# Patient Record
Sex: MALE | Race: WHITE | Employment: FULL TIME | URBAN - METROPOLITAN AREA
[De-identification: names, ages, dates, MRNs, and addresses within clinical notes are randomized per-mention and may not be internally consistent; named-entity substitution may affect disease eponyms.]

---

## 2017-01-18 ENCOUNTER — TELEPHONE (OUTPATIENT)
Dept: FAMILY MEDICINE CLINIC | Age: 62
End: 2017-01-18

## 2017-01-18 DIAGNOSIS — E11.42 DIABETIC POLYNEUROPATHY ASSOCIATED WITH TYPE 2 DIABETES MELLITUS (HCC): ICD-10-CM

## 2017-01-19 RX ORDER — CLONAZEPAM 0.5 MG/1
0.5 TABLET ORAL 2 TIMES DAILY
Qty: 180 TAB | Refills: 1 | OUTPATIENT
Start: 2017-01-19 | End: 2017-07-13 | Stop reason: SDUPTHER

## 2017-01-19 NOTE — TELEPHONE ENCOUNTER
From: Tutu Johnson  To: Kristopher Delatorre MD  Sent: 1/18/2017 9:06 PM EST  Subject: Medication Renewal Request    Original authorizing provider: MD Tutu Medina would like a refill of the following medications:  clonazePAM (KLONOPIN) 0.5 mg tablet Kristopher Delatorre MD]    Preferred pharmacy: [id number]:

## 2017-02-06 ENCOUNTER — OFFICE VISIT (OUTPATIENT)
Dept: FAMILY MEDICINE CLINIC | Age: 62
End: 2017-02-06

## 2017-02-06 VITALS
DIASTOLIC BLOOD PRESSURE: 82 MMHG | TEMPERATURE: 99.3 F | BODY MASS INDEX: 26.31 KG/M2 | SYSTOLIC BLOOD PRESSURE: 138 MMHG | HEIGHT: 68 IN | OXYGEN SATURATION: 97 % | HEART RATE: 103 BPM | RESPIRATION RATE: 16 BRPM | WEIGHT: 173.6 LBS

## 2017-02-06 DIAGNOSIS — J22 LOWER RESP. TRACT INFECTION: Primary | ICD-10-CM

## 2017-02-06 RX ORDER — AZITHROMYCIN 250 MG/1
TABLET, FILM COATED ORAL
Qty: 6 TAB | Refills: 0 | Status: SHIPPED | OUTPATIENT
Start: 2017-02-06 | End: 2017-02-11

## 2017-02-06 RX ORDER — HYDROCODONE POLISTIREX AND CHLORPHENIRAMINE POLISTIREX 10; 8 MG/5ML; MG/5ML
5 SUSPENSION, EXTENDED RELEASE ORAL
Qty: 180 ML | Refills: 0 | Status: SHIPPED | OUTPATIENT
Start: 2017-02-06 | End: 2017-05-19 | Stop reason: ALTCHOICE

## 2017-02-06 NOTE — PROGRESS NOTES
HISTORY OF PRESENT ILLNESS  Janelle Ramos is a 63 y.o. male. Cold Symptoms   The history is provided by the patient and medical records. This is a new problem. Episode onset: 3-4 days ago. Associated symptoms include chills, sore throat (mild) and myalgias. 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) E10.49       Current Outpatient Prescriptions:     lisinopril (PRINIVIL, ZESTRIL) 20 mg tablet, TAKE 1 TABLET BY MOUTH TWICE DAILY, Disp: 180 Tab, Rfl: 2    clonazePAM (KLONOPIN) 0.5 mg tablet, Take 1 Tab by mouth two (2) times a day., Disp: 180 Tab, Rfl: 1    gabapentin (NEURONTIN) 300 mg capsule, TAKE 1 CAPSULE BY MOUTH qhs GENERIC FOR NEURONTIN., Disp: 90 Cap, Rfl: 2    atorvastatin (LIPITOR) 20 mg tablet, TAKE 1 TABLET BY MOUTH DAILY, Disp: 90 Tab, Rfl: 3    omeprazole (PRILOSEC) 40 mg capsule, TAKE ONE CAPSULE BY MOUTH DAILY, Disp: 90 Cap, Rfl: 3    glucagon (GLUCAGON EMERGENCY KIT, HUMAN,) 1 mg injection, 1 mg by IntraMUSCular route as needed (low blood sugar). , Disp: 3 Kit, Rfl: 1    clopidogrel (PLAVIX) 75 mg tablet, TAKE 1 TABLET BY MOUTH DAILY, Disp: 90 Tab, Rfl: 3    Alpha Lipoic Acid 200 mg tab, Take  by mouth daily. , Disp: , Rfl:     HUMALOG 100 unit/mL injection, USE AS DIRECTED WITH PUMP, Disp: 180 mL, Rfl: 12    ascorbic acid (VITAMIN C) 1,000 mg tablet, Take  by mouth daily. , Disp: , Rfl:     multivitamins-minerals-lutein (CENTRUM SILVER) Tab, Take  by mouth daily. , Disp: , Rfl:     vitamin e (E GEMS) 1,000 unit capsule, Take 1,000 Units by mouth daily. Patient is taking 400 iu, Disp: , Rfl:     Cholecalciferol, Vitamin D3, 5,000 unit Tab, Take  by mouth daily. Patient is currently taking 2000 iu, Disp: , Rfl:     omega-3 fatty acids-vitamin e (FISH OIL) 1,000 mg cap, Take 1 Cap by mouth daily. , Disp: , Rfl:     coenzyme q10 10 mg cap, Take  by mouth two (2) times a day., Disp: , Rfl:     Review of Systems   Constitutional: Positive for chills. Negative for fever. HENT: Positive for sore throat (mild). Respiratory: Positive for cough. Negative for sputum production. Burning with cough   Musculoskeletal: Positive for myalgias. Visit Vitals    /82 (BP 1 Location: Left arm, BP Patient Position: Sitting)    Pulse (!) 103    Temp 99.3 °F (37.4 °C) (Oral)    Resp 16    Ht 5' 8\" (1.727 m)    Wt 173 lb 9.6 oz (78.7 kg)    SpO2 97%    BMI 26.4 kg/m2     Physical Exam   Constitutional: He is oriented to person, place, and time. He appears well-developed and well-nourished. HENT:   Head: Normocephalic. Right Ear: Tympanic membrane and ear canal normal.   Left Ear: Tympanic membrane and ear canal normal.   Mouth/Throat: Oropharynx is clear and moist.   Eyes: Conjunctivae and EOM are normal.   Neck: Neck supple. Cardiovascular: Normal rate, regular rhythm and normal heart sounds. Pulmonary/Chest: Effort normal and breath sounds normal.   Lymphadenopathy:     He has no cervical adenopathy. Neurological: He is alert and oriented to person, place, and time. Skin: Skin is warm and dry. Psychiatric: He has a normal mood and affect. His behavior is normal.   Nursing note and vitals reviewed.     CXR - no infiltrarte seen  - radiology interpretation pending  ASSESSMENT and PLAN    ICD-10-CM ICD-9-CM 1. Lower resp. tract infection J22 519.8 XR CHEST PA LAT      azithromycin (ZITHROMAX) 250 mg tablet      chlorpheniramine-HYDROcodone (TUSSIONEX) 10-8 mg/5 mL suspension   Complete prescribed course of antibiotics. Follow up for new symptoms, worsening symptoms or failure to improve.

## 2017-02-06 NOTE — PATIENT INSTRUCTIONS
Complete prescribed course of antibiotics. Follow up for new symptoms, worsening symptoms or failure to improve.

## 2017-02-06 NOTE — MR AVS SNAPSHOT
Visit Information Date & Time Provider Department Dept. Phone Encounter #  
 2/6/2017 10:45 AM Ziyad Barron MD 3 [facility] [phone] [id number] Your Appointments 5/5/2017  9:00 AM  
Follow Up with Richa Abreu MD  
3 [facility]) Appt Note: 6 month fu  
 [address] [facility] [address] [facility] [address] [phone]  
  
   
 [address] Upcoming Health Maintenance Date Due Hepatitis C Screening 1955 MICROALBUMIN Q1 2/12/2017 HEMOGLOBIN A1C Q6M 5/4/2017 EYE EXAM RETINAL OR DILATED Q1 10/21/2017 FOOT EXAM Q1 11/4/2017 LIPID PANEL Q1 11/4/2017 COLONOSCOPY 12/31/2019 DTaP/Tdap/Td series (2 - Td) 8/28/2025 Allergies as of 2/6/2017  Review Complete On: 2/6/2017 By: Ziyad Barron MD  
 No Known Allergies Current Immunizations  Never Reviewed Name Date Influenza Vaccine (Quad) PF 8/26/2016  9:36 AM  
 Influenza Vaccine PF 11/8/2013 Pneumococcal Polysaccharide (PPSV-23) 11/4/2016  9:15 AM  
 Tdap 8/28/2015 Not reviewed this visit You Were Diagnosed With   
  
 Codes Comments Lower resp. tract infection    -  Primary ICD-10-CM: Pardeep Goldmann ICD-9-CM: 519.8 Vitals BP Pulse Temp Resp Height(growth percentile) Weight(growth percentile) 138/82 (BP 1 Location: Left arm, BP Patient Position: Sitting) (!) 103 99.3 °F (37.4 °C) (Oral) 16 5' 8\" (1.727 m) 173 lb 9.6 oz (78.7 kg) SpO2 BMI Smoking Status 97% 26.4 kg/m2 Never Smoker BMI and BSA Data Body Mass Index Body Surface Area  
 26.4 kg/m 2 1.94 m 2 Preferred Pharmacy Pharmacy Name Phone [place] [address], [address] [phone] Your Updated Medication List  
  
   
This list is accurate as of: 2/6/17 11:22 AM.  Always use your most recent med list.  
  
  
  
  
 Alpha Lipoic Acid 200 mg Tab Take  by mouth daily. atorvastatin 20 mg tablet Commonly known as:  LIPITOR  
TAKE 1 TABLET BY MOUTH DAILY  
  
 azithromycin 250 mg tablet Commonly known as:  Radha Pickup Take 2 tablets today, then take 1 tablet daily CENTRUM SILVER Tab tablet Generic drug:  multivitamins-minerals-lutein Take  by mouth daily. chlorpheniramine-HYDROcodone 10-8 mg/5 mL suspension Commonly known as:  Leva [place] Take 5 mL by mouth every twelve (12) hours as needed for Cough. Max Daily Amount: 10 mL. cholecalciferol (VITAMIN D3) 5,000 unit Tab tablet Commonly known as:  VITAMIN D3 Take  by mouth daily. Patient is currently taking 2000 iu  
  
 clonazePAM 0.5 mg tablet Commonly known as:  Willeen Narrow Take 1 Tab by mouth two (2) times a day. clopidogrel 75 mg Tab Commonly known as:  PLAVIX TAKE 1 TABLET BY MOUTH DAILY coenzyme q10 10 mg Cap Take  by mouth two (2) times a day. FISH OIL 1,000 mg Cap Generic drug:  omega-3 fatty acids-vitamin e Take 1 Cap by mouth daily. gabapentin 300 mg capsule Commonly known as:  NEURONTIN  
TAKE 1 CAPSULE BY MOUTH qhs GENERIC FOR NEURONTIN.  
  
 glucagon 1 mg injection Commonly known as:  GLUCAGON EMERGENCY KIT (HUMAN) 1 mg by IntraMUSCular route as needed (low blood sugar). HumaLOG 100 unit/mL injection Generic drug:  insulin lispro USE AS DIRECTED WITH PUMP  
  
 lisinopril 20 mg tablet Commonly known as:  PRINIVIL, ZESTRIL  
TAKE 1 TABLET BY MOUTH TWICE DAILY  
  
 omeprazole 40 mg capsule Commonly known as:  PRILOSEC  
TAKE ONE CAPSULE BY MOUTH DAILY  
  
 VITAMIN C 1,000 mg tablet Generic drug:  ascorbic acid (vitamin C) Take  by mouth daily. vitamin e 1,000 unit capsule Commonly known as:  E GEMS Take 1,000 Units by mouth daily. Patient is taking 400 iu  
  
  
  
  
Prescriptions Printed Refills chlorpheniramine-HYDROcodone (TUSSIONEX) 10-8 mg/5 mL suspension 0 Sig: Take 5 mL by mouth every twelve (12) hours as needed for Cough. Max Daily Amount: 10 mL. Class: Print Route: Oral  
  
Prescriptions Sent to Pharmacy Refills  
 azithromycin (ZITHROMAX) 250 mg tablet 0 Sig: Take 2 tablets today, then take 1 tablet daily Class: Normal  
 Pharmacy: [facility] [address] [place] [address] Rd, [address] #: [phone] To-Do List   
 02/06/2017 Imaging:  XR CHEST PA LAT Patient Instructions Complete prescribed course of antibiotics. Follow up for new symptoms, worsening symptoms or failure to improve. Introducing hospitals & HEALTH SERVICES! Dear Mark Campbell: Thank you for requesting a [employer] account. Our records indicate that you already have an active [employer] account. You can access your account anytime at [email or web address] Did you know that you can access your hospital and ER discharge instructions at any time in Skin Scan? You can also review all of your test results from your hospital stay or ER visit. Additional Information If you have questions, please visit the Frequently Asked Questions section of the [employer] Scan website at [email or web address] Remember, Skin Scan is NOT to be used for urgent needs. For medical emergencies, dial 911. Now available from your iPhone and Android! Please provide this summary of care documentation to your next provider. Your primary care clinician is listed as Brody Jennings. If you have any questions after today's visit, please call [phone].

## 2017-02-06 NOTE — PROGRESS NOTES
Brittany Sotelo is a 63 y.o. male here for cold symptoms      1. Have you been to the ER, urgent care clinic or hospitalized since your last visit? NO.     2. Have you seen or consulted any other health care providers outside of the [address] [place] [address] since your last visit (Include any pap smears or colon screening)? NO      Do you have an Advanced Directive? NO    Would you like information on Advanced Directives?  NO

## 2017-03-10 LAB
CREATININE, EXTERNAL: <15.4
MICROALBUMIN UR TEST STR-MCNC: <3 MG/DL

## 2017-04-21 RX ORDER — TRAZODONE HYDROCHLORIDE 50 MG/1
50 TABLET ORAL
Qty: 30 TAB | Refills: 1 | Status: SHIPPED | OUTPATIENT
Start: 2017-04-21 | End: 2017-11-03 | Stop reason: ALTCHOICE

## 2017-05-19 ENCOUNTER — OFFICE VISIT (OUTPATIENT)
Dept: FAMILY MEDICINE CLINIC | Age: 62
End: 2017-05-19

## 2017-05-19 VITALS
DIASTOLIC BLOOD PRESSURE: 62 MMHG | BODY MASS INDEX: 26.83 KG/M2 | SYSTOLIC BLOOD PRESSURE: 114 MMHG | RESPIRATION RATE: 18 BRPM | HEIGHT: 68 IN | TEMPERATURE: 98.3 F | HEART RATE: 100 BPM | OXYGEN SATURATION: 96 % | WEIGHT: 177 LBS

## 2017-05-19 DIAGNOSIS — E10.40 TYPE 1 DIABETES MELLITUS WITH DIABETIC NEUROPATHY (HCC): Primary | ICD-10-CM

## 2017-05-19 DIAGNOSIS — F51.04 PSYCHOPHYSIOLOGICAL INSOMNIA: ICD-10-CM

## 2017-05-19 LAB — HBA1C MFR BLD HPLC: 7.5 %

## 2017-05-19 NOTE — PROGRESS NOTES
Assessment/Plan:    1. Type 1 diabetes mellitus with diabetic neuropathy (HCC)  -A1c 7.5. Managed by Dr. Elvira Olguin. - AMB POC HEMOGLOBIN A1C    2. Psychophysiological insomnia  -change to tylenol if needed, instead of ibuprofen/aleve    The plan was discussed with the patient. The patient verbalized understanding and is in agreement with the plan. All medication potential side effects were discussed with the patient. Health Maintenance:   Health Maintenance   Topic Date Due    Hepatitis C Screening  1955    ZOSTER VACCINE AGE 60>  02/10/2017    HEMOGLOBIN A1C Q6M  05/04/2017    INFLUENZA AGE 9 TO ADULT  08/01/2017    EYE EXAM RETINAL OR DILATED Q1  10/21/2017    FOOT EXAM Q1  11/04/2017    LIPID PANEL Q1  11/04/2017    MICROALBUMIN Q1  03/11/2018    COLONOSCOPY  12/31/2019    DTaP/Tdap/Td series (2 - Td) 08/28/2025    Pneumococcal 19-64 Medium Risk  Completed     Breezy Layne is a 63 y.o. male and presents with Follow Up Chronic Condition     Subjective:  DM1 - A1c     He c/o not sleeping well still. He will awaken at 1am for \"no reason\". Denies stress/worries. Since 3/2017, he's been taking ibuprofen (not pm) or aleve w/ relief. Typical sleep aids have the opposite effect of make him awake (ie benadryl). Trazodone didn't work either. He denies pain or nocturia. He doesn't drink ETOH.    ROS:  Constitutional: No recent weight change. No weakness/fatigue. No f/c. Cardiovascular: No CP/palpitations. No WILLAMS/orthopnea/PND. Respiratory: No cough/sputum, dyspnea, wheezing. Gastointestinal: No dysphagia, reflux. No n/v. No constipation/diarrhea. No melena/rectal bleeding. Genitourinary: No dysuria, urinary hesitancy, nocturia, hematuria. No incontinence. Neurological: No seizures/numbness/weakness. No paresthesias. Psychiatric:  No depression, anxiety. The problem list was updated as a part of today's visit.   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 SH:  Social History   Substance Use Topics    Smoking status: Never Smoker    Smokeless tobacco: Never Used    Alcohol use 1.5 oz/week     3 Cans of beer per week       Medications/Allergies:  Current Outpatient Prescriptions on File Prior to Visit   Medication Sig Dispense Refill    clopidogrel (PLAVIX) 75 mg tab TAKE 1 TABLET BY MOUTH DAILY 90 Tab 3    traZODone (DESYREL) 50 mg tablet Take 1 Tab by mouth nightly. 30 Tab 1    chlorpheniramine-HYDROcodone (TUSSIONEX) 10-8 mg/5 mL suspension Take 5 mL by mouth every twelve (12) hours as needed for Cough. Max Daily Amount: 10 mL. 180 mL 0    lisinopril (PRINIVIL, ZESTRIL) 20 mg tablet TAKE 1 TABLET BY MOUTH TWICE DAILY 180 Tab 2    clonazePAM (KLONOPIN) 0.5 mg tablet Take 1 Tab by mouth two (2) times a day. 180 Tab 1    gabapentin (NEURONTIN) 300 mg capsule TAKE 1 CAPSULE BY MOUTH qhs GENERIC FOR NEURONTIN. 90 Cap 2    atorvastatin (LIPITOR) 20 mg tablet TAKE 1 TABLET BY MOUTH DAILY 90 Tab 3    omeprazole (PRILOSEC) 40 mg capsule TAKE ONE CAPSULE BY MOUTH DAILY 90 Cap 3    glucagon (GLUCAGON EMERGENCY KIT, HUMAN,) 1 mg injection 1 mg by IntraMUSCular route as needed (low blood sugar). 3 Kit 1    Alpha Lipoic Acid 200 mg tab Take  by mouth daily.       HUMALOG 100 unit/mL injection USE AS DIRECTED WITH PUMP 180 mL 12    ascorbic acid (VITAMIN C) 1,000 mg tablet Take  by mouth daily.  multivitamins-minerals-lutein (CENTRUM SILVER) Tab Take  by mouth daily.  vitamin e (E GEMS) 1,000 unit capsule Take 1,000 Units by mouth daily. Patient is taking 400 iu      Cholecalciferol, Vitamin D3, 5,000 unit Tab Take  by mouth daily. Patient is currently taking 2000 iu      omega-3 fatty acids-vitamin e (FISH OIL) 1,000 mg cap Take 1 Cap by mouth daily.  coenzyme q10 10 mg cap Take  by mouth two (2) times a day. No current facility-administered medications on file prior to visit. No Known Allergies    Objective:  Visit Vitals    /62 (BP 1 Location: Right arm, BP Patient Position: Sitting)    Pulse 100    Temp 98.3 °F (36.8 °C) (Oral)    Resp 18    Ht 5' 8\" (1.727 m)    Wt 177 lb (80.3 kg)    SpO2 96%    BMI 26.91 kg/m2      Constitutional: Well developed, nourished, no distress, alert   CV: S1, S2.  RRR. No murmurs/rubs. No thrills palpated. No carotid bruits. Intact distal pulses. No edema. Pulm: No abnormalities on inspection. Clear to auscultation bilaterally. No wheezing/rhonchi. Normal effort. GI: Soft, nontender, nondistended. Normal active bowel sounds. Neuro: A/O x 3. No focal motor or sensory deficits. Speech normal.   Skin: No lesions/rashes on inspection. Psych: Appropriate affect, judgement and insight. Short-term memory intact.

## 2017-05-19 NOTE — MR AVS SNAPSHOT
Visit Information Date & Time Provider Department Dept. Phone Encounter #  
 5/19/2017  1:00 PM Xuan Milan, 3 [facility] [phone] [id number] Your Appointments 5/19/2017  1:00 PM  
Follow Up with Xuan Milan MD  
3 [facility] [address] [facility]) Appt Note: 6 month fu; r/s, provider out of office [address] [facility] [address] [phone]  
  
   
 [address]  [address] [facility] Upcoming Health Maintenance Date Due Hepatitis C Screening 1955 ZOSTER VACCINE AGE 60> 2/10/2017 INFLUENZA AGE 9 TO ADULT 8/1/2017 EYE EXAM RETINAL OR DILATED Q1 10/21/2017 FOOT EXAM Q1 11/4/2017 LIPID PANEL Q1 11/4/2017 HEMOGLOBIN A1C Q6M 11/19/2017 MICROALBUMIN Q1 3/11/2018 COLONOSCOPY 12/31/2019 DTaP/Tdap/Td series (2 - Td) 8/28/2025 Allergies as of 5/19/2017  Review Complete On: 5/19/2017 By: Xuan Milan MD  
 No Known Allergies Current Immunizations  Never Reviewed Name Date Influenza Vaccine (Quad) PF 8/26/2016  9:36 AM  
 Influenza Vaccine PF 11/8/2013 Pneumococcal Polysaccharide (PPSV-23) 11/4/2016  9:15 AM  
 Tdap 8/28/2015 Not reviewed this visit You Were Diagnosed With   
  
 Codes Comments Type 1 diabetes mellitus with diabetic neuropathy (HCC)    -  Primary ICD-10-CM: E10.40 ICD-9-CM: 250.61, 357.2 Psychophysiological insomnia     ICD-10-CM: F51.04 
ICD-9-CM: 307.42 Vitals BP Pulse Temp Resp Height(growth percentile) Weight(growth percentile) 114/62 (BP 1 Location: Right arm, BP Patient Position: Sitting) 100 98.3 °F (36.8 °C) (Oral) 18 5' 8\" (1.727 m) 177 lb (80.3 kg) SpO2 BMI Smoking Status 96% 26.91 kg/m2 Never Smoker Vitals History BMI and BSA Data Body Mass Index Body Surface Area  
 26.91 kg/m 2 1.96 m 2 Preferred Pharmacy Pharmacy Name Phone [place] [address] [address], [address] [facility] [address] [facility] [phone] Your Updated Medication List  
  
   
This list is accurate as of: 5/19/17 11:38 AM.  Always use your most recent med list.  
  
  
  
  
 Alpha Lipoic Acid 200 mg Tab Take  by mouth daily. atorvastatin 20 mg tablet Commonly known as:  LIPITOR  
TAKE 1 TABLET BY MOUTH DAILY CENTRUM SILVER Tab tablet Generic drug:  multivitamins-minerals-lutein Take  by mouth daily. cholecalciferol (VITAMIN D3) 5,000 unit Tab tablet Commonly known as:  VITAMIN D3 Take  by mouth daily. Patient is currently taking 2000 iu  
  
 clonazePAM 0.5 mg tablet Commonly known as:  Wendelyn Mech Take 1 Tab by mouth two (2) times a day. clopidogrel 75 mg Tab Commonly known as:  PLAVIX TAKE 1 TABLET BY MOUTH DAILY coenzyme q10 10 mg Cap Take  by mouth two (2) times a day. FISH OIL 1,000 mg Cap Generic drug:  omega-3 fatty acids-vitamin e Take 1 Cap by mouth daily. gabapentin 300 mg capsule Commonly known as:  NEURONTIN  
TAKE 1 CAPSULE BY MOUTH qhs GENERIC FOR NEURONTIN.  
  
 glucagon 1 mg injection Commonly known as:  GLUCAGON EMERGENCY KIT (HUMAN) 1 mg by IntraMUSCular route as needed (low blood sugar). HumaLOG 100 unit/mL injection Generic drug:  insulin lispro USE AS DIRECTED WITH PUMP  
  
 lisinopril 20 mg tablet Commonly known as:  PRINIVIL, ZESTRIL  
TAKE 1 TABLET BY MOUTH TWICE DAILY  
  
 omeprazole 40 mg capsule Commonly known as:  PRILOSEC  
TAKE ONE CAPSULE BY MOUTH DAILY  
  
 traZODone 50 mg tablet Commonly known as:  Indonesian Never Take 1 Tab by mouth nightly. VITAMIN C 1,000 mg tablet Generic drug:  ascorbic acid (vitamin C) Take  by mouth daily. vitamin e 1,000 unit capsule Commonly known as:  E GEMS Take 1,000 Units by mouth daily. Patient is taking 400 iu We Performed the Following AMB POC HEMOGLOBIN A1C [33307 CPT(R)] Introducing [facility] [place] & HEALTH SERVICES! Dear Yordan De La O: Thank you for requesting a Diffon account. Our records indicate that you already have an active Diffon account. You can access your account anytime at [email or web address] Did you know that you can access your hospital and ER discharge instructions at any time in Diffon? You can also review all of your test results from your hospital stay or ER visit. Additional Information If you have questions, please visit the Frequently Asked Questions section of the Diffon website at [email or web address]/[email or web address]. Remember, Diffon is NOT to be used for urgent needs. For medical emergencies, dial 911. Now available from your iPhone and Android! Please provide this summary of care documentation to your next provider. Your primary care clinician is listed as Brody Jennings. If you have any questions after today's visit, please call [phone].

## 2017-05-19 NOTE — PROGRESS NOTES
1. Have you been to the ER, urgent care clinic since your last visit? Hospitalized since your last visit? No     2. Have you seen or consulted any other health care providers outside of the [address] [place] [address] since your last visit? Include any pap smears or colon screening.    Yes, Dr. Shavon Patton retina specialists (PDT) Nov 2016/Jan- May  2017 , Dr. Lise Peterson ophthalmology Nov 2016/Jan 2017 -May 2017 (YAG procedure) cataract surgeon, Dr. Benito [place] Dec 2016/March 2017, dentist Jan 2017

## 2017-07-11 ENCOUNTER — OFFICE VISIT (OUTPATIENT)
Dept: FAMILY MEDICINE CLINIC | Age: 62
End: 2017-07-11

## 2017-07-11 VITALS
HEART RATE: 90 BPM | HEIGHT: 68 IN | WEIGHT: 177 LBS | OXYGEN SATURATION: 98 % | BODY MASS INDEX: 26.83 KG/M2 | DIASTOLIC BLOOD PRESSURE: 96 MMHG | RESPIRATION RATE: 12 BRPM | TEMPERATURE: 98.1 F | SYSTOLIC BLOOD PRESSURE: 160 MMHG

## 2017-07-11 DIAGNOSIS — J40 BRONCHITIS: Primary | ICD-10-CM

## 2017-07-11 RX ORDER — AZITHROMYCIN 250 MG/1
TABLET, FILM COATED ORAL
Qty: 6 TAB | Refills: 0 | Status: SHIPPED | OUTPATIENT
Start: 2017-07-11 | End: 2017-07-16

## 2017-07-11 RX ORDER — BENZONATATE 200 MG/1
200 CAPSULE ORAL
Qty: 15 CAP | Refills: 0 | Status: SHIPPED | OUTPATIENT
Start: 2017-07-11 | End: 2017-07-18

## 2017-07-11 NOTE — MR AVS SNAPSHOT
Visit Information Date & Time Provider Department Dept. Phone Encounter #  
 7/11/2017  3:45 PM Beena Mcintosh NP [address] [phone] [id number] Your Appointments 11/3/2017  1:00 PM  
Follow Up with Bibi Jimenez MD  
[address] [place] [address]) Appt Note: 6 month f/u  
 [address] 2[address] [facility] [address] [phone]  
  
   
 [address] Upcoming Health Maintenance Date Due Hepatitis C Screening 1955 ZOSTER VACCINE AGE 60> 2/10/2017 INFLUENZA AGE 9 TO ADULT 8/1/2017 FOOT EXAM Q1 11/4/2017 LIPID PANEL Q1 11/4/2017 HEMOGLOBIN A1C Q6M 11/19/2017 MICROALBUMIN Q1 3/11/2018 EYE EXAM RETINAL OR DILATED Q1 5/4/2018 COLONOSCOPY 12/31/2019 DTaP/Tdap/Td series (2 - Td) 8/28/2025 Allergies as of 7/11/2017  Review Complete On: 7/11/2017 By: Beena Mcintosh NP No Known Allergies Current Immunizations  Never Reviewed Name Date Influenza Vaccine (Quad) PF 8/26/2016  9:36 AM  
 Influenza Vaccine PF 11/8/2013 Pneumococcal Polysaccharide (PPSV-23) 11/4/2016  9:15 AM  
 Tdap 8/28/2015 Not reviewed this visit You Were Diagnosed With   
  
 Codes Comments Bronchitis    -  Primary ICD-10-CM: C06 ICD-9-CM: 473 Vitals BP Pulse Temp Resp Height(growth percentile) Weight(growth percentile) (!) 160/96 90 98.1 °F (36.7 °C) (Oral) 12 5' 8\" (1.727 m) 177 lb (80.3 kg) SpO2 BMI Smoking Status 98% 26.91 kg/m2 Never Smoker Vitals History BMI and BSA Data Body Mass Index Body Surface Area  
 26.91 kg/m 2 1.96 m 2 Preferred Pharmacy Pharmacy Name Phone [place] [facility] [address], [address] [place] [address] 10 [facility] [phone] Your Updated Medication List  
  
   
This list is accurate as of: 7/11/17  4:05 PM.  Always use your most recent med list.  
  
  
  
  
 Alpha Lipoic Acid 200 mg Tab Take  by mouth daily. atorvastatin 20 mg tablet Commonly known as:  LIPITOR  
TAKE 1 TABLET BY MOUTH DAILY  
  
 azithromycin 250 mg tablet Commonly known as:  [place] Take 2 tablets today, then take 1 tablet daily  
  
 benzonatate 200 mg capsule Commonly known as:  TESSALON Take 1 Cap by mouth three (3) times daily as needed for Cough for up to 7 days. CENTRUM SILVER Tab tablet Generic drug:  multivitamins-minerals-lutein Take  by mouth daily. cholecalciferol (VITAMIN D3) 5,000 unit Tab tablet Commonly known as:  VITAMIN D3 Take  by mouth daily. Patient is currently taking 2000 iu  
  
 clonazePAM 0.5 mg tablet Commonly known as:  [place] Take 1 Tab by mouth two (2) times a day. clopidogrel 75 mg Tab Commonly known as:  PLAVIX TAKE 1 TABLET BY MOUTH DAILY coenzyme q10 10 mg Cap Take  by mouth two (2) times a day. FISH OIL 1,000 mg Cap Generic drug:  omega-3 fatty acids-vitamin e Take 1 Cap by mouth daily. gabapentin 300 mg capsule Commonly known as:  NEURONTIN  
TAKE 1 CAPSULE BY MOUTH qhs GENERIC FOR NEURONTIN.  
  
 glucagon 1 mg injection Commonly known as:  GLUCAGON EMERGENCY KIT (HUMAN) 1 mg by IntraMUSCular route as needed (low blood sugar). HumaLOG 100 unit/mL injection Generic drug:  insulin lispro USE AS DIRECTED WITH PUMP  
  
 lisinopril 20 mg tablet Commonly known as:  PRINIVIL, ZESTRIL  
TAKE 1 TABLET BY MOUTH TWICE DAILY  
  
 omeprazole 40 mg capsule Commonly known as:  PRILOSEC  
TAKE ONE CAPSULE BY MOUTH DAILY  
  
 traZODone 50 mg tablet Commonly known as:  [place] Take 1 Tab by mouth nightly. VITAMIN C 1,000 mg tablet Generic drug:  ascorbic acid (vitamin C) Take  by mouth daily. vitamin e 1,000 unit capsule Commonly known as:  E GEMS Take 1,000 Units by mouth daily. Patient is taking 400 iu Prescriptions Sent to Pharmacy Refills  
 benzonatate (TESSALON) 200 mg capsule 0 Sig: Take 1 Cap by mouth three (3) times daily as needed for Cough for up to 7 days. Class: Normal  
 Pharmacy: [facility] [address] #: [phone] Route: Oral  
 azithromycin (ZITHROMAX) 250 mg tablet 0 Sig: Take 2 tablets today, then take 1 tablet daily Class: Normal  
 Pharmacy: [facility] [address] #: [phone] Patient Instructions Bronchitis: Care Instructions Your Care Instructions Bronchitis is inflammation of the bronchial tubes, which carry air to the lungs. The tubes swell and produce mucus, or phlegm. The mucus and inflamed bronchial tubes make you cough. You may have trouble breathing. Most cases of bronchitis are caused by viruses like those that cause colds. Antibiotics usually do not help and they may be harmful. Bronchitis usually develops rapidly and lasts about 2 to 3 weeks in otherwise healthy people. Follow-up care is a key part of your treatment and safety. Be sure to make and go to all appointments, and call your doctor if you are having problems. It's also a good idea to know your test results and keep a list of the medicines you take. How can you care for yourself at home? · Take all medicines exactly as prescribed. Call your doctor if you think you are having a problem with your medicine. · Get some extra rest. 
· Take an over-the-counter pain medicine, such as acetaminophen (Tylenol), ibuprofen (Advil, Motrin), or naproxen (Aleve) to reduce fever and relieve body aches. Read and follow all instructions on the label. · Do not take two or more pain medicines at the same time unless the doctor told you to.  Many pain medicines have acetaminophen, which is Tylenol. Too much acetaminophen (Tylenol) can be harmful. · Take an over-the-counter cough medicine that contains dextromethorphan to help quiet a dry, hacking cough so that you can sleep. Avoid cough medicines that have more than one active ingredient. Read and follow all instructions on the label. · Breathe moist air from a humidifier, hot shower, or sink filled with hot water. The heat and moisture will thin mucus so you can cough it out. · Do not smoke. Smoking can make bronchitis worse. If you need help quitting, talk to your doctor about stop-smoking programs and medicines. These can increase your chances of quitting for good. When should you call for help? Call 911 anytime you think you may need emergency care. For example, call if: 
· You have severe trouble breathing. Call your doctor now or seek immediate medical care if: 
· You have new or worse trouble breathing. · You cough up dark brown or bloody mucus (sputum). · You have a new or higher fever. · You have a new rash. Watch closely for changes in your health, and be sure to contact your doctor if: 
· You cough more deeply or more often, especially if you notice more mucus or a change in the color of your mucus. · You are not getting better as expected. Where can you learn more? Go to http://iftikhar-lucia.info/. Enter H333 in the search box to learn more about \"Bronchitis: Care Instructions. \" Current as of: March 25, 2017 Content Version: 11.3 © 9604-5398 [employer]. Care instructions adapted under license by [email or web address] (which disclaims liability or warranty for this information). If you have questions about a medical condition or this instruction, always ask your healthcare professional. [place] [address] any warranty or liability for your use of this information. Introducing [facility] [place] [facility]! Dear Kalpana Edouard: Thank you for requesting a [employer] account. Our records indicate that you already have an active [employer] account. You can access your account anytime at [email or web address] Did you know that you can access your hospital and ER discharge instructions at any time in [employer]? You can also review all of your test results from your hospital stay or ER visit. Additional Information If you have questions, please visit the Frequently Asked Questions section of the Pure[employer] website at [email or web address] Remember, Pure[employer]Fi is NOT to be used for urgent needs. For medical emergencies, dial 911. Now available from your iPhone and Android! Please provide this summary of care documentation to your next provider. Your primary care clinician is listed as Brody Jennings. If you have any questions after today's visit, please call [phone].

## 2017-07-11 NOTE — PATIENT INSTRUCTIONS
Bronchitis: Care Instructions  Your Care Instructions    Bronchitis is inflammation of the bronchial tubes, which carry air to the lungs. The tubes swell and produce mucus, or phlegm. The mucus and inflamed bronchial tubes make you cough. You may have trouble breathing. Most cases of bronchitis are caused by viruses like those that cause colds. Antibiotics usually do not help and they may be harmful. Bronchitis usually develops rapidly and lasts about 2 to 3 weeks in otherwise healthy people. Follow-up care is a key part of your treatment and safety. Be sure to make and go to all appointments, and call your doctor if you are having problems. It's also a good idea to know your test results and keep a list of the medicines you take. How can you care for yourself at home? · Take all medicines exactly as prescribed. Call your doctor if you think you are having a problem with your medicine. · Get some extra rest.  · Take an over-the-counter pain medicine, such as acetaminophen (Tylenol), ibuprofen (Advil, Motrin), or naproxen (Aleve) to reduce fever and relieve body aches. Read and follow all instructions on the label. · Do not take two or more pain medicines at the same time unless the doctor told you to. Many pain medicines have acetaminophen, which is Tylenol. Too much acetaminophen (Tylenol) can be harmful. · Take an over-the-counter cough medicine that contains dextromethorphan to help quiet a dry, hacking cough so that you can sleep. Avoid cough medicines that have more than one active ingredient. Read and follow all instructions on the label. · Breathe moist air from a humidifier, hot shower, or sink filled with hot water. The heat and moisture will thin mucus so you can cough it out. · Do not smoke. Smoking can make bronchitis worse. If you need help quitting, talk to your doctor about stop-smoking programs and medicines. These can increase your chances of quitting for good.   When should you call for help? Call 911 anytime you think you may need emergency care. For example, call if:  · You have severe trouble breathing. Call your doctor now or seek immediate medical care if:  · You have new or worse trouble breathing. · You cough up dark brown or bloody mucus (sputum). · You have a new or higher fever. · You have a new rash. Watch closely for changes in your health, and be sure to contact your doctor if:  · You cough more deeply or more often, especially if you notice more mucus or a change in the color of your mucus. · You are not getting better as expected. Where can you learn more? Go to http://iftikhar-lucia.info/. Enter H333 in the search box to learn more about \"Bronchitis: Care Instructions. \"  Current as of: March 25, 2017  Content Version: 11.3  © 6651-0084 [employer]. Care instructions adapted under license by [employer] (which disclaims liability or warranty for this information). If you have questions about a medical condition or this instruction, always ask your healthcare professional. [address] any warranty or liability for your use of this information.

## 2017-07-12 NOTE — PROGRESS NOTES
Subjective:      Wally Ramirez is an 63 y.o. male here for evaluation of cough. The cough is non-productive, without wheezing, dyspnea or hemoptysis, harsh, worsening over time and is aggravated by nothing. Onset of symptoms was 7 days ago, gradually worsening since that time. Associated symptoms include heartburn. Patient does not have a history of asthma. Patient does not have a history of environmental allergens. Patient has not recent travel. Patient does not have a history of smoking. Patient  has not previous Chest X-ray. Patient has not had a PPD done. Past Medical History:   Diagnosis Date    Branch retinal vein occlusion 2/4/2015    Diabetes mellitus ([facility]     Elevated cholesterol     GERD (gastroesophageal reflux disease) 8/20/2013    GERD (gastroesophageal reflux disease) 8/20/2013    H/O echocardiogram     nml    High triglycerides     Hypertension     Kidney stone     Normal cardiac stress test 2006    Reactive arthritis of multiple sites ([facility] 8/20/2013    TIA (transient ischemic attack) 11/7/2014     Family History   Problem Relation Age of Onset    Cancer Father 79     prostate    Parkinsonism Father     Other Other      parkinson uncle    Cancer Maternal Grandfather      mesothelioma     Current Outpatient Prescriptions   Medication Sig Dispense Refill    benzonatate (TESSALON) 200 mg capsule Take 1 Cap by mouth three (3) times daily as needed for Cough for up to 7 days. 15 Cap 0    azithromycin (ZITHROMAX) 250 mg tablet Take 2 tablets today, then take 1 tablet daily 6 Tab 0    clopidogrel (PLAVIX) 75 mg tab TAKE 1 TABLET BY MOUTH DAILY 90 Tab 3    traZODone (DESYREL) 50 mg tablet Take 1 Tab by mouth nightly. 30 Tab 1    lisinopril (PRINIVIL, ZESTRIL) 20 mg tablet TAKE 1 TABLET BY MOUTH TWICE DAILY 180 Tab 2    clonazePAM (KLONOPIN) 0.5 mg tablet Take 1 Tab by mouth two (2) times a day.  180 Tab 1    gabapentin (NEURONTIN) 300 mg capsule TAKE 1 CAPSULE BY MOUTH qhs GENERIC FOR NEURONTIN. 90 Cap 2    atorvastatin (LIPITOR) 20 mg tablet TAKE 1 TABLET BY MOUTH DAILY 90 Tab 3    omeprazole (PRILOSEC) 40 mg capsule TAKE ONE CAPSULE BY MOUTH DAILY 90 Cap 3    glucagon (GLUCAGON EMERGENCY KIT, HUMAN,) 1 mg injection 1 mg by IntraMUSCular route as needed (low blood sugar). 3 Kit 1    Alpha Lipoic Acid 200 mg tab Take  by mouth daily.  HUMALOG 100 unit/mL injection USE AS DIRECTED WITH PUMP 180 mL 12    ascorbic acid (VITAMIN C) 1,000 mg tablet Take  by mouth daily.  multivitamins-minerals-lutein (CENTRUM SILVER) Tab Take  by mouth daily.  vitamin e (E GEMS) 1,000 unit capsule Take 1,000 Units by mouth daily. Patient is taking 400 iu      Cholecalciferol, Vitamin D3, 5,000 unit Tab Take  by mouth daily. Patient is currently taking 2000 iu      omega-3 fatty acids-vitamin e (FISH OIL) 1,000 mg cap Take 1 Cap by mouth daily.  coenzyme q10 10 mg cap Take  by mouth two (2) times a day. No Known Allergies  Social History     Social History    Marital status:      Spouse name: N/A    Number of children: N/A    Years of education: N/A     Occupational History    Not on file. Social History Main Topics    Smoking status: Never Smoker    Smokeless tobacco: Never Used    Alcohol use No    Drug use: No    Sexual activity: Not on file     Other Topics Concern    Not on file     Social History Narrative     Review of Systems  A comprehensive review of systems was negative except for that written in the HPI.     Objective:     Visit Vitals    BP (!) 160/96    Pulse 90    Temp 98.1 °F (36.7 °C) (Oral)    Resp 12    Ht 5' 8\" (1.727 m)    Wt 177 lb (80.3 kg)    SpO2 98%    BMI 26.91 kg/m2     Oxygens Saturation 98% on  room air  General:  alert, cooperative, no distress, appears stated age   HEENT:  ENT exam normal, no neck nodes or sinus tenderness   Lungs: clear to auscultation bilaterally   Heart:  regular rate and rhythm, S1, S2 normal, no murmur, click, rub or gallop   Abdomen: soft, non-tender. Bowel sounds normal. No masses,  no organomegaly   Extremities: extremities normal, atraumatic, no cyanosis or edema      Neurological: alert, oriented x 3, no defects noted in general exam.     Assessment:     1. Bronchitis    - benzonatate (TESSALON) 200 mg capsule; Take 1 Cap by mouth three (3) times daily as needed for Cough for up to 7 days. Dispense: 15 Cap; Refill: 0  - azithromycin (ZITHROMAX) 250 mg tablet; Take 2 tablets today, then take 1 tablet daily  Dispense: 6 Tab; Refill: 0    Plan:   Follow up with  Your primary care provider or urgent care  if symptoms worsens or fail to improve within the next three to four days.

## 2017-07-13 ENCOUNTER — DOCUMENTATION ONLY (OUTPATIENT)
Dept: FAMILY MEDICINE CLINIC | Age: 62
End: 2017-07-13

## 2017-07-13 DIAGNOSIS — E11.42 DIABETIC POLYNEUROPATHY ASSOCIATED WITH TYPE 2 DIABETES MELLITUS (HCC): ICD-10-CM

## 2017-07-13 RX ORDER — CODEINE PHOSPHATE AND GUAIFENESIN 10; 100 MG/5ML; MG/5ML
5 SOLUTION ORAL
Qty: 180 ML | Refills: 0 | Status: SHIPPED | OUTPATIENT
Start: 2017-07-13 | End: 2017-11-03 | Stop reason: ALTCHOICE

## 2017-07-14 RX ORDER — CLONAZEPAM 0.5 MG/1
0.5 TABLET ORAL 2 TIMES DAILY
Qty: 180 TAB | Refills: 1 | Status: SHIPPED | OUTPATIENT
Start: 2017-07-14 | End: 2017-11-03 | Stop reason: SDUPTHER

## 2017-07-14 NOTE — TELEPHONE ENCOUNTER
From: Madeleine Abbott  To: Edwina Abreu MD  Sent: 7/13/2017 11:16 PM EDT  Subject: Medication Renewal Request    Original authorizing provider: MD Madeleine Anna would like a refill of the following medications:  clonazePAM (KLONOPIN) 0.5 mg tablet Edwina Abreu MD]    Preferred pharmacy: [address]:

## 2017-10-23 RX ORDER — LISINOPRIL 20 MG/1
TABLET ORAL
Qty: 180 TAB | Refills: 0 | Status: SHIPPED | OUTPATIENT
Start: 2017-10-23 | End: 2017-12-22 | Stop reason: SDUPTHER

## 2017-11-03 ENCOUNTER — OFFICE VISIT (OUTPATIENT)
Dept: FAMILY MEDICINE CLINIC | Age: 62
End: 2017-11-03

## 2017-11-03 VITALS
BODY MASS INDEX: 25.4 KG/M2 | HEART RATE: 105 BPM | RESPIRATION RATE: 20 BRPM | WEIGHT: 167.6 LBS | DIASTOLIC BLOOD PRESSURE: 60 MMHG | OXYGEN SATURATION: 97 % | HEIGHT: 68 IN | SYSTOLIC BLOOD PRESSURE: 110 MMHG | TEMPERATURE: 98.4 F

## 2017-11-03 DIAGNOSIS — Z00.00 ROUTINE GENERAL MEDICAL EXAMINATION AT A HEALTH CARE FACILITY: ICD-10-CM

## 2017-11-03 DIAGNOSIS — E11.42 DIABETIC POLYNEUROPATHY ASSOCIATED WITH TYPE 2 DIABETES MELLITUS (HCC): ICD-10-CM

## 2017-11-03 DIAGNOSIS — Z11.59 SPECIAL SCREENING EXAMINATION FOR VIRAL DISEASE: ICD-10-CM

## 2017-11-03 DIAGNOSIS — E10.40 TYPE 1 DIABETES MELLITUS WITH DIABETIC NEUROPATHY (HCC): Primary | ICD-10-CM

## 2017-11-03 DIAGNOSIS — I10 BENIGN HYPERTENSION: ICD-10-CM

## 2017-11-03 LAB — HBA1C MFR BLD HPLC: 6.8 %

## 2017-11-03 RX ORDER — CLONAZEPAM 0.5 MG/1
0.5 TABLET ORAL DAILY
Qty: 90 TAB | Refills: 1 | Status: SHIPPED | OUTPATIENT
Start: 2017-11-03 | End: 2018-03-18 | Stop reason: SDUPTHER

## 2017-11-03 NOTE — PROGRESS NOTES
Assessment/Plan:    1. Type 1 diabetes mellitus with diabetic neuropathy (HCC)  -A1c is 6.8  - AMB POC HEMOGLOBIN A1C  - MICROALBUMIN, UR, RAND W/ MICROALBUMIN/CREA RATIO; Future  -  DIABETES FOOT EXAM    2. Benign hypertension  -cont current regimen  - METABOLIC PANEL, COMPREHENSIVE; Future  - LIPID PANEL; Future  - CBC W/O DIFF; Future    3. Routine general medical examination at a health care facility  - METABOLIC PANEL, COMPREHENSIVE; Future  - LIPID PANEL; Future  - CBC W/O DIFF; Future    4. Special screening examination for viral disease  - HCV AB W/RFLX TO MARY [facility]; Future    The plan was discussed with the patient. The patient verbalized understanding and is in agreement with the plan. All medication potential side effects were discussed with the patient. Health Maintenance:   Health Maintenance   Topic Date Due    Hepatitis C Screening  1955    LIPID PANEL Q1  11/04/2017    HEMOGLOBIN A1C Q6M  11/19/2017    MICROALBUMIN Q1  03/11/2018    EYE EXAM RETINAL OR DILATED Q1  08/10/2018    FOOT EXAM Q1  11/03/2018    COLONOSCOPY  12/31/2019    DTaP/Tdap/Td series (2 - Td) 08/28/2025    ZOSTER VACCINE AGE 60>  Completed    Pneumococcal 19-64 Medium Risk  Completed    INFLUENZA AGE 9 TO ADULT  Completed       Jennifer Myrick is a 63 y.o. male and presents with Hypertension; Cholesterol Problem; and Diabetes     Subjective:  DM1 - A1c . Managed by endo. HTN - bp controlled. Up to date on preventative health care. ROS:  Constitutional: No recent weight change. No weakness/fatigue. No f/c. Cardiovascular: No CP/palpitations. No WILLAMS/orthopnea/PND. Respiratory: No cough/sputum, dyspnea, wheezing. Gastointestinal: No dysphagia, reflux. No n/v. No constipation/diarrhea. No melena/rectal bleeding. Neurological: No seizures/numbness/weakness. No paresthesias. Psychiatric:  No depression, anxiety. The problem list was updated as a part of today's visit.   Patient Active Problem List   Diagnosis Code    Benign hypertension I10    [facility] M02.39    Diabetic neuropathy (HCC) E11.40    GERD (gastroesophageal reflux disease) K21.9    History of nephrolithiasis Z87.442    Insulin pump in place Z96.41    Diabetic retinopathy ([facility] E11.319    Diplopia H53.2    Cerebral microvascular disease I67.9    Diffuse cerebral atrophy G31.9    Encephalomalacia on imaging study G93.89    History of stroke Z86.73    Choroidal neovascularization H35.059    Branch retinal vein occlusion K73.6753     (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 SH:  Social History   Substance Use Topics    Smoking status: Never Smoker    Smokeless tobacco: Never Used    Alcohol use No       Medications/Allergies:  Current Outpatient Prescriptions on File Prior to Visit   Medication Sig Dispense Refill    lisinopril (PRINIVIL, ZESTRIL) 20 mg tablet TAKE 1 TABLET BY MOUTH TWICE DAILY 180 Tab 0    atorvastatin (LIPITOR) 20 mg tablet TAKE 1 TABLET BY MOUTH DAILY 90 Tab 3    omeprazole (PRILOSEC) 40 mg capsule TAKE 1 CAPSULE BY MOUTH DAILY 90 Cap 3    gabapentin (NEURONTIN) 300 mg capsule TAKE 1 CAPSULE BY MOUTH EVERY NIGHT AT BEDTIME 90 Cap 3    clonazePAM (KLONOPIN) 0.5 mg tablet Take 1 Tab by mouth two (2) times a day. 180 Tab 1    clopidogrel (PLAVIX) 75 mg tab TAKE 1 TABLET BY MOUTH DAILY 90 Tab 3    glucagon (GLUCAGON EMERGENCY KIT, HUMAN,) 1 mg injection 1 mg by IntraMUSCular route as needed (low blood sugar). 3 Kit 1    Alpha Lipoic Acid 200 mg tab Take  by mouth daily.  HUMALOG 100 unit/mL injection USE AS DIRECTED WITH PUMP 180 mL 12    ascorbic acid (VITAMIN C) 1,000 mg tablet Take  by mouth daily.  multivitamins-minerals-lutein (CENTRUM SILVER) Tab Take  by mouth daily.       vitamin e (E GEMS) 1,000 unit capsule Take 1,000 Units by mouth daily. Patient is taking 400 iu      Cholecalciferol, Vitamin D3, 5,000 unit Tab Take  by mouth daily. Patient is currently taking 2000 iu      omega-3 fatty acids-vitamin e (FISH OIL) 1,000 mg cap Take 1 Cap by mouth daily.  coenzyme q10 10 mg cap Take  by mouth two (2) times a day.  guaiFENesin-codeine (ROBITUSSIN AC) 100-10 mg/5 mL solution Take 5 mL by mouth nightly as needed for Cough. Max Daily Amount: 5 mL. 180 mL 0    traZODone (DESYREL) 50 mg tablet Take 1 Tab by mouth nightly. 30 Tab 1     No current facility-administered medications on file prior to visit. No Known Allergies    Objective:  Visit Vitals    /60 (BP 1 Location: Right arm, BP Patient Position: Sitting)    Pulse (!) 105    Temp 98.4 °F (36.9 °C) (Oral)    Resp 20    Ht 5' 8\" (1.727 m)    Wt 167 lb 9.6 oz (76 kg)    SpO2 97%    BMI 25.48 kg/m2      Constitutional: Well developed, nourished, no distress, alert   CV: S1, S2.  RRR. No murmurs/rubs. No thrills palpated. No carotid bruits. Intact distal pulses. No edema. Pulm: No abnormalities on inspection. Clear to auscultation bilaterally. No wheezing/rhonchi. Normal effort. Neuro: A/O x 3. No focal motor or sensory deficits. Speech normal.   Psych: Appropriate affect, judgement and insight. Short-term memory intact.      Monofilament nml

## 2017-11-03 NOTE — MR AVS SNAPSHOT
Visit Information Date & Time Provider Department Dept. Phone Encounter #  
 11/3/2017  1:00 PM Nalini Espinosa, 3 [facility] [phone] [id number] Upcoming Health Maintenance Date Due Hepatitis C Screening 1955 LIPID PANEL Q1 11/4/2017 HEMOGLOBIN A1C Q6M 11/19/2017 MICROALBUMIN Q1 3/11/2018 EYE EXAM RETINAL OR DILATED Q1 8/10/2018 FOOT EXAM Q1 11/3/2018 COLONOSCOPY 12/31/2019 DTaP/Tdap/Td series (2 - Td) 8/28/2025 Allergies as of 11/3/2017  Review Complete On: 11/3/2017 By: Nalini Espinosa MD  
 No Known Allergies Current Immunizations  Never Reviewed Name Date Influenza Vaccine 9/29/2017 Influenza Vaccine (Quad) PF 8/26/2016  9:36 AM  
 Influenza Vaccine PF 11/8/2013 Pneumococcal Polysaccharide (PPSV-23) 11/4/2016  9:15 AM  
 Tdap 8/28/2015 Not reviewed this visit You Were Diagnosed With   
  
 Codes Comments Type 1 diabetes mellitus with diabetic neuropathy (HCC)    -  Primary ICD-10-CM: E10.40 ICD-9-CM: 250.61, 357.2 Benign hypertension     ICD-10-CM: I10 
ICD-9-CM: 401.1 Routine general medical examination at a health care facility     ICD-10-CM: Z00.00 ICD-9-CM: V70.0 Special screening examination for viral disease     ICD-10-CM: Z11.59 
ICD-9-CM: V73.99 Diabetic polyneuropathy associated with type 2 diabetes mellitus ([facility]     ICD-10-CM: E11.42 
ICD-9-CM: 250.60, 357.2 Vitals BP Pulse Temp Resp Height(growth percentile) Weight(growth percentile) 110/60 (BP 1 Location: Right arm, BP Patient Position: Sitting) (!) 105 98.4 °F (36.9 °C) (Oral) 20 5' 8\" (1.727 m) 167 lb 9.6 oz (76 kg) SpO2 BMI Smoking Status 97% 25.48 kg/m2 Never Smoker Vitals History BMI and BSA Data Body Mass Index Body Surface Area  
 25.48 kg/m 2 1.91 m 2 Preferred Pharmacy Pharmacy Name Phone  [address], [address] [facility] [address] [facility] [phone] Your Updated Medication List  
  
   
This list is accurate as of: 11/3/17  1:22 PM.  Always use your most recent med list.  
  
  
  
  
 Alpha Lipoic Acid 200 mg Tab Take  by mouth daily. atorvastatin 20 mg tablet Commonly known as:  LIPITOR  
TAKE 1 TABLET BY MOUTH DAILY CENTRUM SILVER Tab tablet Generic drug:  multivitamins-minerals-lutein Take  by mouth daily. cholecalciferol (VITAMIN D3) 5,000 unit Tab tablet Commonly known as:  VITAMIN D3 Take  by mouth daily. Patient is currently taking 2000 iu  
  
 clonazePAM 0.5 mg tablet Commonly known as:  Rhetta [place] Take 1 Tab by mouth daily. Max Daily Amount: 0.5 mg.  
  
 clopidogrel 75 mg Tab Commonly known as:  PLAVIX TAKE 1 TABLET BY MOUTH DAILY coenzyme q10 10 mg Cap Take  by mouth two (2) times a day. FISH OIL 1,000 mg Cap Generic drug:  omega-3 fatty acids-vitamin e Take 1 Cap by mouth daily. gabapentin 300 mg capsule Commonly known as:  NEURONTIN  
TAKE 1 CAPSULE BY MOUTH EVERY NIGHT AT BEDTIME  
  
 glucagon 1 mg injection Commonly known as:  GLUCAGON EMERGENCY KIT (HUMAN) 1 mg by IntraMUSCular route as needed (low blood sugar). HumaLOG 100 unit/mL injection Generic drug:  insulin lispro USE AS DIRECTED WITH PUMP  
  
 lisinopril 20 mg tablet Commonly known as:  PRINIVIL, ZESTRIL  
TAKE 1 TABLET BY MOUTH TWICE DAILY  
  
 omeprazole 40 mg capsule Commonly known as:  PRILOSEC  
TAKE 1 CAPSULE BY MOUTH DAILY  
  
 VITAMIN C 1,000 mg tablet Generic drug:  ascorbic acid (vitamin C) Take  by mouth daily. vitamin e 1,000 unit capsule Commonly known as:  E GEMS Take 1,000 Units by mouth daily. Patient is taking 400 iu  
  
  
  
  
Prescriptions Printed Refills  
 clonazePAM (KLONOPIN) 0.5 mg tablet 1 Sig: Take 1 Tab by mouth daily. Max Daily Amount: 0.5 mg.  
 Class: Print  Route: Oral  
  
 We Performed the Following AMB POC HEMOGLOBIN A1C [72074 CPT(R)]  DIABETES FOOT EXAM [HM7 Custom] To-Do List   
 11/03/2017 Lab:  CBC W/O DIFF   
  
 11/03/2017 Lab:  HCV AB W/RFLX TO MARY BETH   
  
 11/03/2017 Lab:  LIPID PANEL   
  
 11/03/2017 Lab:  METABOLIC PANEL, COMPREHENSIVE   
  
 11/03/2017 Lab:  MICROALBUMIN, UR, RAND W/ MICROALBUMIN/CREA RATIO Introducing [facility] & HEALTH SERVICES! Dear Marisela Rucker: Thank you for requesting a [employer] account. Our records indicate that you already have an active [employer] account. You can access your account anytime at [email or web address] Did you know that you can access your hospital and ER discharge instructions at any time in Kids Movie? You can also review all of your test results from your hospital stay or ER visit. Additional Information If you have questions, please visit the Frequently Asked Questions section of the [employer] website at [email or web address] Remember, [employer] is NOT to be used for urgent needs. For medical emergencies, dial 911. Now available from your iPhone and Android! Please provide this summary of care documentation to your next provider. Your primary care clinician is listed as Brody Jennings. If you have any questions after today's visit, please call [phone].

## 2017-11-03 NOTE — PROGRESS NOTES
Alicia Cochran is a 63 y.o. male (: 1955) presenting to address:    Chief Complaint   Patient presents with    Hypertension    Cholesterol Problem    Diabetes       Vitals:    17 1303   BP: 110/60   Pulse: (!) 105   Resp: 20   Temp: 98.4 °F (36.9 °C)   TempSrc: Oral   SpO2: 97%   Weight: 167 lb 9.6 oz (76 kg)   Height: 5' 8\" (1.727 m)   PainSc:   0 - No pain       Learning Assessment:     Learning Assessment 2015   PRIMARY LEARNER Patient   HIGHEST LEVEL OF EDUCATION - PRIMARY LEARNER  4 YEARS OF COLLEGE   BARRIERS PRIMARY LEARNER NONE   CO-LEARNER CAREGIVER No   PRIMARY LANGUAGE ENGLISH    NEED No   LEARNER PREFERENCE PRIMARY DEMONSTRATION     DEMONSTRATION   ANSWERED BY Patient   RELATIONSHIP SELF     Depression Screening:     PHQ over the last two weeks 2017   Little interest or pleasure in doing things Not at all   Feeling down, depressed or hopeless Not at all   Total Score PHQ 2 0     Abuse Screening:     Abuse Screening Questionnaire 11/3/2017   Do you ever feel afraid of your partner? N   Are you in a relationship with someone who physically or mentally threatens you? N   Is it safe for you to go home? Y     Coordination of Care Questionaire:   1. Have you been to the ER, urgent care clinic since your last visit? Hospitalized since your last visit? NO    2. Have you seen or consulted any other health care providers outside of the [address] [place] [address] since your last visit? Include any pap smears or colon screening. YES. Endo 10/20/17, retina specialist 17, dentist 8/11/27 8/10/17 ophthalmology  8/10/17    Advanced Directive:   1. Do you have an Advanced Directive? NO    2. Would you like information on Advanced Directives?  YES

## 2017-11-11 DIAGNOSIS — E11.42 TYPE 2 DIABETES MELLITUS WITH DIABETIC POLYNEUROPATHY (HCC): ICD-10-CM

## 2017-11-15 RX ORDER — GLUCAGON 1 MG
VIAL (EA) INJECTION
Qty: 3 KIT | Refills: 0 | Status: SHIPPED | OUTPATIENT
Start: 2017-11-15 | End: 2018-06-19 | Stop reason: SDUPTHER

## 2017-12-04 LAB — HBA1C MFR BLD HPLC: 7.4 %

## 2017-12-07 PROBLEM — K22.10 EROSIVE ESOPHAGITIS: Status: ACTIVE | Noted: 2017-12-07

## 2017-12-12 ENCOUNTER — TELEPHONE (OUTPATIENT)
Dept: FAMILY MEDICINE CLINIC | Age: 62
End: 2017-12-12

## 2017-12-12 NOTE — TELEPHONE ENCOUNTER
Patient is going to be seen by PT for 2 week 3 times a week. Patient is going to be seen for balance and endurance training. Physical Therapist wanted to make the provider aware that the patient has drug interaction between the omeprazole and the Clopidogrel. Physical therapy wanted to make sure that the provider will be signing off on the Physical therapy orders.      PLease advise

## 2017-12-22 ENCOUNTER — OFFICE VISIT (OUTPATIENT)
Dept: FAMILY MEDICINE CLINIC | Age: 62
End: 2017-12-22

## 2017-12-22 VITALS
RESPIRATION RATE: 20 BRPM | SYSTOLIC BLOOD PRESSURE: 98 MMHG | HEIGHT: 68 IN | BODY MASS INDEX: 23.95 KG/M2 | DIASTOLIC BLOOD PRESSURE: 57 MMHG | TEMPERATURE: 98.3 F | WEIGHT: 158 LBS | OXYGEN SATURATION: 96 % | HEART RATE: 93 BPM

## 2017-12-22 DIAGNOSIS — Z09 HOSPITAL DISCHARGE FOLLOW-UP: ICD-10-CM

## 2017-12-22 DIAGNOSIS — K22.10 EROSIVE ESOPHAGITIS: ICD-10-CM

## 2017-12-22 DIAGNOSIS — I10 BENIGN HYPERTENSION: Primary | ICD-10-CM

## 2017-12-22 RX ORDER — LISINOPRIL 10 MG/1
TABLET ORAL
Qty: 90 TAB | Refills: 0
Start: 2017-12-22 | End: 2018-03-18 | Stop reason: SDUPTHER

## 2017-12-22 NOTE — PROGRESS NOTES
Assessment/Plan:    1. Benign hypertension  -decrease dose to 10 mg.  F/u in 3 mos  - lisinopril (PRINIVIL, ZESTRIL) 10 mg tablet; TAKE 1 TABLET BY MOUTH DAILY  Dispense: 90 Tab; Refill: 0    2. Erosive esophagitis  -has f/u with GI scheduled for repeat endo in 2mos    3. Hospital discharge follow-up      The plan was discussed with the patient. The patient verbalized understanding and is in agreement with the plan. All medication potential side effects were discussed with the patient. Health Maintenance:   Health Maintenance   Topic Date Due    Hepatitis C Screening  1955    LIPID PANEL Q1  11/04/2017    MICROALBUMIN Q1  03/11/2018    HEMOGLOBIN A1C Q6M  05/03/2018    EYE EXAM RETINAL OR DILATED Q1  08/10/2018    FOOT EXAM Q1  11/03/2018    COLONOSCOPY  12/31/2019    DTaP/Tdap/Td series (2 - Td) 08/28/2025    ZOSTER VACCINE AGE 60>  Completed    Pneumococcal 19-64 Medium Risk  Completed    Influenza Age 5 to Adult  Completed       Rosio Angelo is a 63 y.o. male and presents with Hospital Follow Up     Subjective:  Pt recently hospitalized for esophagitis and UGI bleed. He was started on sucralfate, PPI. Pt has lost close to 10lb in past month. Plans to redo endoscopy in 2 mos. I have reviewed the labs and endoscopy report from the hospital.  Anemia was mild upon discharge. He has done home PT. He had a NST, which was negative. ROS:  Constitutional: No recent weight change. No weakness/fatigue. No f/c. Cardiovascular: No CP/palpitations. No WILLAMS/orthopnea/PND. Respiratory: No cough/sputum, dyspnea, wheezing. Gastointestinal: No dysphagia, reflux. No n/v. No constipation/diarrhea. No melena/rectal bleeding. The problem list was updated as a part of today's visit.   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 SH:  Social History   Substance Use Topics    Smoking status: Never Smoker    Smokeless tobacco: Never Used    Alcohol use No       Medications/Allergies:  Current Outpatient Prescriptions on File Prior to Visit   Medication Sig Dispense Refill    GLUCAGON EMERGENCY KIT, HUMAN, 1 mg injection INJECT 1MG IN THE MUSCLE AS NEEDED(LOW BLOOD SUGAR) 3 Kit 0    clonazePAM (KLONOPIN) 0.5 mg tablet Take 1 Tab by mouth daily. Max Daily Amount: 0.5 mg. 90 Tab 1    lisinopril (PRINIVIL, ZESTRIL) 20 mg tablet TAKE 1 TABLET BY MOUTH TWICE DAILY 180 Tab 0    atorvastatin (LIPITOR) 20 mg tablet TAKE 1 TABLET BY MOUTH DAILY 90 Tab 3    omeprazole (PRILOSEC) 40 mg capsule TAKE 1 CAPSULE BY MOUTH DAILY 90 Cap 3    gabapentin (NEURONTIN) 300 mg capsule TAKE 1 CAPSULE BY MOUTH EVERY NIGHT AT BEDTIME 90 Cap 3    clopidogrel (PLAVIX) 75 mg tab TAKE 1 TABLET BY MOUTH DAILY 90 Tab 3    Alpha Lipoic Acid 200 mg tab Take  by mouth daily.  HUMALOG 100 unit/mL injection USE AS DIRECTED WITH PUMP 180 mL 12    ascorbic acid (VITAMIN C) 1,000 mg tablet Take  by mouth daily.  multivitamins-minerals-lutein (CENTRUM SILVER) Tab Take  by mouth daily.  vitamin e (E GEMS) 1,000 unit capsule Take 1,000 Units by mouth daily. Patient is taking 400 iu      Cholecalciferol, Vitamin D3, 5,000 unit Tab Take  by mouth daily.  Patient is currently taking 2000 iu      omega-3 fatty acids-vitamin e (FISH OIL) 1,000 mg cap Take 1 Cap by mouth daily.  coenzyme q10 10 mg cap Take  by mouth two (2) times a day. No current facility-administered medications on file prior to visit. No Known Allergies    Objective:  Visit Vitals    BP 98/57 (BP 1 Location: Right arm, BP Patient Position: Sitting)    Pulse 93    Temp 98.3 °F (36.8 °C) (Oral)    Resp 20    Ht 5' 8\" (1.727 m)    Wt 158 lb (71.7 kg)    SpO2 96%    BMI 24.02 kg/m2      Constitutional: Well developed, nourished, no distress, alert   CV: S1, S2.  RRR. No murmurs/rubs. No thrills palpated. No carotid bruits. Intact distal pulses. No edema. Pulm: No abnormalities on inspection. Clear to auscultation bilaterally. No wheezing/rhonchi. Normal effort. GI: Soft, nontender, nondistended. Normal active bowel sounds.

## 2017-12-22 NOTE — PATIENT INSTRUCTIONS
Esophagitis: Care Instructions  Your Care Instructions    Esophagitis (say \"ih-sof-uh-JY-tus\") is irritation of the esophagus, the tube that carries food from your throat to your stomach. Acid reflux is the most common cause of this condition. When you have reflux, stomach acid and juices flow upward. This can cause pain or a burning feeling in your chest. You may have a sore throat. It may be hard to swallow. Other causes of this condition include some medicines and supplements. Allergies or an infection can also cause it. Your doctor will ask about your symptoms and past health. He or she might do tests to find the cause of your symptoms. Treatment depends on what is causing the problem. Treatment might include changing your diet or taking medicine to relieve your symptoms. It might also include changing a medicine that is causing your symptoms. If you have reflux, medicine that reduces the stomach acid helps your body heal. It might take 1 to 3 weeks to heal.  Follow-up care is a key part of your treatment and safety. Be sure to make and go to all appointments, and call your doctor if you are having problems. It's also a good idea to know your test results and keep a list of the medicines you take. How can you care for yourself at home? · If you have acid reflux, your doctor may recommend that you:  ¨ Eat several small meals instead of two or three large meals. After you eat, wait 2 to 3 hours before you lie down. ¨ Avoid chocolate, mint, alcohol, and spicy foods. ¨ Don't smoke or use smokeless tobacco. Smoking can make this condition worse. If you need help quitting, talk to your doctor about stop-smoking programs and medicines. These can increase your chances of quitting for good. ¨ Raise the head of your bed 6 to 8 inches if you have symptoms at night. ¨ Lose weight if you are overweight. ¨ Take an over-the-counter antacid, such as Maalox, Mylanta, or Tums.  Be careful when you take over-the-counter antacid medicines. Many of these medicines have aspirin in them. Read the label to make sure that you are not taking more than the recommended dose. Too much aspirin can be harmful. ¨ Take stronger acid reducers. Examples are famotidine (such as Pepcid), omeprazole (such as Prilosec), and ranitidine (such as Zantac). · If your condition is caused by infection, allergy, or other problems, use the medicine or treatments that your doctor recommends. · Be safe with medicines. Take your medicines exactly as prescribed. Call your doctor if you think you are having a problem with your medicine. When should you call for help? Call your doctor now or seek immediate medical care if:  ? · You have new or worse belly pain. ? · You are vomiting. ? Watch closely for changes in your health, and be sure to contact your doctor if:  ? · You have new or worse symptoms of reflux. ? · You have trouble or pain swallowing. ? · You are losing weight. ? · You do not get better as expected. Where can you learn more? Go to http://iftikhar-lucia.info/. Enter B645 in the search box to learn more about \"Esophagitis: Care Instructions. \"  Current as of: May 12, 2017  Content Version: 11.4  © 5828-4855 Healthwise, Incorporated. Care instructions adapted under license by [employer] (which disclaims liability or warranty for this information). If you have questions about a medical condition or this instruction, always ask your healthcare professional. [place] [address] any warranty or liability for your use of this information.

## 2017-12-22 NOTE — MR AVS SNAPSHOT
Visit Information Date & Time Provider Department Dept. Phone Encounter #  
 12/22/2017  2:15 PM Yanira Austin, 3 [facility] [phone] [id number] Follow-up Instructions Return in about 3 months (around 3/22/2018). Follow-up and Disposition History Your Appointments 5/4/2018  1:00 PM  
Office Visit with Yanira Austin MD  
3 [facility] Appt Note: 6 month f/u  
 [address] [facility] [address] [facility] [id number] [phone]  
  
   
 [address] [facility] [address] [facility] Upcoming Health Maintenance Date Due Hepatitis C Screening 1955 LIPID PANEL Q1 11/4/2017 MICROALBUMIN Q1 3/11/2018 HEMOGLOBIN A1C Q6M 5/3/2018 EYE EXAM RETINAL OR DILATED Q1 8/10/2018 FOOT EXAM Q1 11/3/2018 COLONOSCOPY 12/31/2019 DTaP/Tdap/Td series (2 - Td) 8/28/2025 Allergies as of 12/22/2017  Review Complete On: 12/22/2017 By: Yanira Austin MD  
 No Known Allergies Current Immunizations  Reviewed on 12/22/2017 Name Date Influenza Vaccine 12/4/2017 12:00 AM, 9/29/2017, 11/1/2013 12:00 AM  
 Influenza Vaccine (Quad) PF 8/26/2016  9:36 AM  
 Influenza Vaccine PF 11/8/2013 Pneumococcal Polysaccharide (PPSV-23) 11/4/2016  9:15 AM, 3/1/2011 12:00 AM  
 Tdap 8/28/2015 Reviewed by Emeli Cabrera LPN on 13/51/7363 at 10:19 AM  
You Were Diagnosed With   
  
 Codes Comments Benign hypertension    -  Primary ICD-10-CM: I10 
ICD-9-CM: 401.1 Erosive esophagitis     ICD-10-CM: K22.10 ICD-9-CM: 530.19 Hospital discharge follow-up     ICD-10-CM: [address] [facility] ICD-9-CM: V67.59 Vitals BP Pulse Temp Resp Height(growth percentile) Weight(growth percentile) 98/57 (BP 1 Location: Right arm, BP Patient Position: Sitting) 93 98.3 °F (36.8 °C) (Oral) 20 5' 8\" (1.727 m) 158 lb (71.7 kg) SpO2 BMI Smoking Status 96% 24.02 kg/m2 Never Smoker Vitals History BMI and BSA Data Body Mass Index Body Surface Area 24.02 kg/m 2 1.85 m 2 Preferred Pharmacy Pharmacy Name Phone [facility] [address] [facility], [address] [phone] Your Updated Medication List  
  
   
This list is accurate as of: 12/22/17  2:22 PM.  Always use your most recent med list.  
  
  
  
  
 Alpha Lipoic Acid 200 mg Tab Take  by mouth daily. atorvastatin 20 mg tablet Commonly known as:  LIPITOR  
TAKE 1 TABLET BY MOUTH DAILY CENTRUM SILVER Tab tablet Generic drug:  multivitamins-minerals-lutein Take  by mouth daily. cholecalciferol (VITAMIN D3) 5,000 unit Tab tablet Commonly known as:  VITAMIN D3 Take  by mouth daily. Patient is currently taking 2000 iu  
  
 clonazePAM 0.5 mg tablet Commonly known as:  [place] Take 1 Tab by mouth daily. Max Daily Amount: 0.5 mg.  
  
 clopidogrel 75 mg Tab Commonly known as:  PLAVIX TAKE 1 TABLET BY MOUTH DAILY coenzyme q10 10 mg Cap Take  by mouth two (2) times a day. FISH OIL 1,000 mg Cap Generic drug:  omega-3 fatty acids-vitamin e Take 1 Cap by mouth daily. gabapentin 300 mg capsule Commonly known as:  NEURONTIN  
TAKE 1 CAPSULE BY MOUTH EVERY NIGHT AT BEDTIME  
  
 GLUCAGON EMERGENCY KIT (HUMAN) 1 mg injection Generic drug:  glucagon INJECT 1MG IN THE MUSCLE AS NEEDED(LOW BLOOD SUGAR) HumaLOG 100 unit/mL injection Generic drug:  insulin lispro USE AS DIRECTED WITH PUMP  
  
 lisinopril 10 mg tablet Commonly known as:  PRINIVIL, ZESTRIL  
TAKE 1 TABLET BY MOUTH DAILY  
  
 omeprazole 40 mg capsule Commonly known as:  PRILOSEC  
TAKE 1 CAPSULE BY MOUTH DAILY  
  
 VITAMIN C 1,000 mg tablet Generic drug:  ascorbic acid (vitamin C) Take  by mouth daily. vitamin e 1,000 unit capsule Commonly known as:  E GEMS  
 Take 1,000 Units by mouth daily. Patient is taking 400 iu We Performed the Following AMB EXT HGBA1C [[id number] CPT(R)] Comments: This external order was created through the Results Console. Follow-up Instructions Return in about 3 months (around 3/22/2018). Patient Instructions Esophagitis: Care Instructions Your Care Instructions Esophagitis (say \"ih-sof-uh-JY-tus\") is irritation of the esophagus, the tube that carries food from your throat to your stomach. Acid reflux is the most common cause of this condition. When you have reflux, stomach acid and juices flow upward. This can cause pain or a burning feeling in your chest. You may have a sore throat. It may be hard to swallow. Other causes of this condition include some medicines and supplements. Allergies or an infection can also cause it. Your doctor will ask about your symptoms and past health. He or she might do tests to find the cause of your symptoms. Treatment depends on what is causing the problem. Treatment might include changing your diet or taking medicine to relieve your symptoms. It might also include changing a medicine that is causing your symptoms. If you have reflux, medicine that reduces the stomach acid helps your body heal. It might take 1 to 3 weeks to heal. 
Follow-up care is a key part of your treatment and safety. Be sure to make and go to all appointments, and call your doctor if you are having problems. It's also a good idea to know your test results and keep a list of the medicines you take. How can you care for yourself at home? · If you have acid reflux, your doctor may recommend that you: 
¨ Eat several small meals instead of two or three large meals. After you eat, wait 2 to 3 hours before you lie down. ¨ Avoid chocolate, mint, alcohol, and spicy foods.  
¨ Don't smoke or use smokeless tobacco. Smoking can make this condition worse. If you need help quitting, talk to your doctor about stop-smoking programs and medicines. These can increase your chances of quitting for good. ¨ Raise the head of your bed 6 to 8 inches if you have symptoms at night. ¨ Lose weight if you are overweight. ¨ Take an over-the-counter antacid, such as Maalox, Mylanta, or Tums. Be careful when you take over-the-counter antacid medicines. Many of these medicines have aspirin in them. Read the label to make sure that you are not taking more than the recommended dose. Too much aspirin can be harmful. ¨ Take stronger acid reducers. Examples are famotidine (such as Pepcid), omeprazole (such as Prilosec), and ranitidine (such as Zantac). · If your condition is caused by infection, allergy, or other problems, use the medicine or treatments that your doctor recommends. · Be safe with medicines. Take your medicines exactly as prescribed. Call your doctor if you think you are having a problem with your medicine. When should you call for help? Call your doctor now or seek immediate medical care if: 
? · You have new or worse belly pain. ? · You are vomiting. ? Watch closely for changes in your health, and be sure to contact your doctor if: 
? · You have new or worse symptoms of reflux. ? · You have trouble or pain swallowing. ? · You are losing weight. ? · You do not get better as expected. Where can you learn more? Go to http://iftikhar-lucia.info/. Enter E706 in the search box to learn more about \"Esophagitis: Care Instructions. \" Current as of: May 12, 2017 Content Version: 11.4 © 8642-2338 [employer]. Care instructions adapted under license by [employer] (which disclaims liability or warranty for this information).  If you have questions about a medical condition or this instruction, always ask your healthcare professional. [address] any warranty or liability for your use of this information. Introducing [facility] & HEALTH SERVICES! Dear Kenisha Bass: Thank you for requesting a [employer] account. Our records indicate that you already have an active [employer] account. You can access your account anytime at [email or web address] Did you know that you can access your hospital and ER discharge instructions at any time in [employer]? You can also review all of your test results from your hospital stay or ER visit. Additional Information If you have questions, please visit the Frequently Asked Questions section of the [employer] website at [email or web address] Remember, [employer] is NOT to be used for urgent needs. For medical emergencies, dial 911. Now available from your iPhone and Android! Please provide this summary of care documentation to your next provider. Your primary care clinician is listed as Brody Jennings. If you have any questions after today's visit, please call [phone].

## 2017-12-22 NOTE — PROGRESS NOTES
Rosa Gautam is a 63 y.o. male (: 1955) presenting to address:    Chief Complaint   Patient presents with   [facility] Follow Up       Vitals:    17 1403   BP: 98/57   Pulse: 93   Resp: 20   Temp: 98.3 °F (36.8 °C)   TempSrc: Oral   SpO2: 96%   Weight: 158 lb (71.7 kg)   Height: 5' 8\" (1.727 m)   PainSc:   0 - No pain       Learning Assessment:     Learning Assessment 2015   PRIMARY LEARNER Patient   HIGHEST LEVEL OF EDUCATION - PRIMARY LEARNER  4 YEARS OF COLLEGE   BARRIERS PRIMARY LEARNER NONE   CO-LEARNER CAREGIVER No   PRIMARY LANGUAGE ENGLISH    NEED No   LEARNER PREFERENCE PRIMARY DEMONSTRATION     DEMONSTRATION   ANSWERED BY Patient   RELATIONSHIP SELF     Depression Screening:     PHQ over the last two weeks 2017   Little interest or pleasure in doing things Not at all   Feeling down, depressed or hopeless Not at all   Total Score PHQ 2 0     Fall Risk Assessment:     Fall Risk Assessment, last 12 mths 2017   Able to walk? Yes   Fall in past 12 months? No     Abuse Screening:     Abuse Screening Questionnaire 11/3/2017   Do you ever feel afraid of your partner? N   Are you in a relationship with someone who physically or mentally threatens you? N   Is it safe for you to go home? Y     Coordination of Care Questionaire:   1. Have you been to the ER, urgent care clinic since your last visit? Hospitalized since your last visit? YES hospitals 17-     2. Have you seen or consulted any other health care providers outside of the [address] [place] [address] since your last visit? Include any pap smears or colon screening. NO    Advanced Directive:   1. Do you have an Advanced Directive? YES    2. Would you like information on Advanced Directives?  NO

## 2018-01-27 LAB
CREATININE, EXTERNAL: 0.73
HBA1C MFR BLD HPLC: 6.8 %
LDL-C, EXTERNAL: 59

## 2018-02-06 ENCOUNTER — TELEPHONE (OUTPATIENT)
Dept: FAMILY MEDICINE CLINIC | Age: 63
End: 2018-02-06

## 2018-02-07 ENCOUNTER — TELEPHONE (OUTPATIENT)
Dept: FAMILY MEDICINE CLINIC | Age: 63
End: 2018-02-07

## 2018-02-07 NOTE — TELEPHONE ENCOUNTER
Pt's therapy office , [facility] called asking for copies of recent records (release sent), faxed to [phone] for psychiatrist to review  They are also asking that we write a letter stating he is compliant with his medications and he is stable medically.

## 2018-02-15 ENCOUNTER — DOCUMENTATION ONLY (OUTPATIENT)
Dept: FAMILY MEDICINE CLINIC | Age: 63
End: 2018-02-15

## 2018-02-15 NOTE — PROGRESS NOTES
Medical records sent again to [facility] as needed. Alternate fax [phone]. Faxed to MARYA Bran. Called the office so they are aware to look for them.

## 2018-02-20 ENCOUNTER — DOCUMENTATION ONLY (OUTPATIENT)
Dept: FAMILY MEDICINE CLINIC | Age: 63
End: 2018-02-20

## 2018-02-20 NOTE — PROGRESS NOTES
Records release received from [address] [place] [address]. They are able to release his records to us. Sent to scanning.

## 2018-02-23 ENCOUNTER — TELEPHONE (OUTPATIENT)
Dept: FAMILY MEDICINE CLINIC | Age: 63
End: 2018-02-23

## 2018-02-23 PROBLEM — R44.0 AUDITORY HALLUCINATION: Status: ACTIVE | Noted: 2018-02-23

## 2018-02-23 NOTE — TELEPHONE ENCOUNTER
Dr Zay Watts, psychiatry called asking that pcp order brain imaging and neuropsych eval for late onset hallucinations. [phone].

## 2018-02-23 NOTE — TELEPHONE ENCOUNTER
I spoke with Dr. Kenyetta Simon who just requested MRI. I requested pt make appt to address issues. Dr. Kenyetta Simon said the patient (who was still in his office) would set up appt.

## 2018-03-01 ENCOUNTER — OFFICE VISIT (OUTPATIENT)
Dept: FAMILY MEDICINE CLINIC | Age: 63
End: 2018-03-01

## 2018-03-01 VITALS
SYSTOLIC BLOOD PRESSURE: 110 MMHG | TEMPERATURE: 98.4 F | OXYGEN SATURATION: 97 % | BODY MASS INDEX: 23.95 KG/M2 | WEIGHT: 158 LBS | HEART RATE: 99 BPM | HEIGHT: 68 IN | DIASTOLIC BLOOD PRESSURE: 68 MMHG | RESPIRATION RATE: 20 BRPM

## 2018-03-01 DIAGNOSIS — I67.89 CEREBRAL MICROVASCULAR DISEASE: ICD-10-CM

## 2018-03-01 DIAGNOSIS — R44.0 AUDITORY HALLUCINATION: Primary | ICD-10-CM
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
## 2018-03-01 NOTE — MR AVS SNAPSHOT
[facility] 
 
 
 [address] [facility] [id number] [phone] Patient: Horacio Willis MRN: [id number] EZA:[id number] Visit Information Date & Time Provider Department Dept. Phone Encounter #  
 3/1/2018  8:45 AM Sergey Myrick, [facility] [phone] [id number] Your Appointments 5/4/2018  1:00 PM  
Office Visit with Sergey Myrick MD  
3 [address]) Appt Note: 6 month f/u  
 [address] [facility] [address] [facility] [id number] [phone]  
  
   
 [address] Upcoming Health Maintenance Date Due Hepatitis C Screening 1955 LIPID PANEL Q1 11/4/2017 MICROALBUMIN Q1 3/11/2018 HEMOGLOBIN A1C Q6M 7/26/2018 EYE EXAM RETINAL OR DILATED Q1 8/10/2018 FOOT EXAM Q1 11/3/2018 COLONOSCOPY 12/31/2019 DTaP/Tdap/Td series (2 - Td) 8/28/2025 Allergies as of 3/1/2018  Review Complete On: 3/1/2018 By: Sergey Myrick MD  
 No Known Allergies Current Immunizations  Reviewed on 12/22/2017 Name Date Influenza Vaccine 12/4/2017 12:00 AM, 9/29/2017, 11/1/2013 12:00 AM  
 Influenza Vaccine (Quad) PF 8/26/2016  9:36 AM  
 Influenza Vaccine PF 11/8/2013 Pneumococcal Polysaccharide (PPSV-23) 11/4/2016  9:15 AM, 3/1/2011 12:00 AM  
 Tdap 8/28/2015 Not reviewed this visit You Were Diagnosed With   
  
 Codes Comments Auditory hallucination    -  Primary ICD-10-CM: R44.0 ICD-9-CM: 780.1 Cerebral microvascular disease     ICD-10-CM: I67.9 ICD-9-CM: 437.9 Vitals BP Pulse Temp Resp Height(growth percentile) Weight(growth percentile) 110/68 (BP 1 Location: Right arm, BP Patient Position: Sitting) 99 98.4 °F (36.9 °C) (Oral) 20 5' 8\" (1.727 m) 158 lb (71.7 kg) SpO2 BMI Smoking Status 97% 24.02 kg/m2 Never Smoker Vitals History BMI and BSA Data Body Mass Index Body Surface Area 24.02 kg/m 2 1.85 m 2 Preferred Pharmacy Pharmacy Name Phone [facility] [address], [address] [facility] [address] [facility] [phone] Your Updated Medication List  
  
   
This list is accurate as of 3/1/18  9:02 AM.  Always use your most recent med list.  
  
  
  
  
 Alpha Lipoic Acid 200 mg Tab Take  by mouth daily. atorvastatin 20 mg tablet Commonly known as:  LIPITOR  
TAKE 1 TABLET BY MOUTH DAILY CENTRUM SILVER Tab tablet Generic drug:  multivitamins-minerals-lutein Take  by mouth daily. cholecalciferol (VITAMIN D3) 5,000 unit Tab tablet Commonly known as:  VITAMIN D3 Take  by mouth daily. Patient is currently taking 2000 iu  
  
 clonazePAM 0.5 mg tablet Commonly known as:  Pamila Hippo Take 1 Tab by mouth daily. Max Daily Amount: 0.5 mg.  
  
 clopidogrel 75 mg Tab Commonly known as:  PLAVIX TAKE 1 TABLET BY MOUTH DAILY coenzyme q10 10 mg Cap Take  by mouth two (2) times a day. FISH OIL 1,000 mg Cap Generic drug:  omega-3 fatty acids-vitamin e Take 1 Cap by mouth daily. gabapentin 300 mg capsule Commonly known as:  NEURONTIN  
TAKE 1 CAPSULE BY MOUTH EVERY NIGHT AT BEDTIME  
  
 GLUCAGON EMERGENCY KIT (HUMAN) 1 mg injection Generic drug:  glucagon INJECT 1MG IN THE MUSCLE AS NEEDED(LOW BLOOD SUGAR) HumaLOG U-100 Insulin 100 unit/mL injection Generic drug:  insulin lispro USE AS DIRECTED WITH PUMP  
  
 LATUDA 20 mg Tab tablet Generic drug:  lurasidone Take 20 mg by mouth daily (with dinner). lisinopril 10 mg tablet Commonly known as:  PRINIVIL, ZESTRIL  
TAKE 1 TABLET BY MOUTH DAILY  
  
 omeprazole 40 mg capsule Commonly known as:  PRILOSEC  
TAKE 1 CAPSULE BY MOUTH DAILY  
  
 VITAMIN C 1,000 mg tablet Generic drug:  ascorbic acid (vitamin C) Take  by mouth daily. vitamin e 1,000 unit capsule Commonly known as:  E GEMS Take 1,000 Units by mouth daily. Patient is taking 400 iu To-Do List   
 03/01/2018 Imaging:  MRI BRAIN WO CONT Referral Information Referral ID Referred By Referred To  
  
 [id number] Lu Carver V Not Available Visits Status Start Date End Date 1 New Request 3/1/18 3/1/19 If your referral has a status of pending review or denied, additional information will be sent to support the outcome of this decision. Introducing hospitals & HEALTH SERVICES! Dear Cait Collier: Thank you for requesting a [employer] account. Our records indicate that you already have an active [employer] account. You can access your account anytime at [email or web address] Did you know that you can access your hospital and ER discharge instructions at any time in [employer]? You can also review all of your test results from your hospital stay or ER visit. Additional Information If you have questions, please visit the Frequently Asked Questions section of the [employer] website at [email or web address] Remember, [employer] is NOT to be used for urgent needs. For medical emergencies, dial 911. Now available from your iPhone and Android! Please provide this summary of care documentation to your next provider. Your primary care clinician is listed as Brody Jennings. If you have any questions after today's visit, please call [phone].

## 2018-03-01 NOTE — PROGRESS NOTES
Gin Hdz is a 66 y.o. male (: 1955) presenting to address:    Chief Complaint   Patient presents with    Referral Request    New Order     Scan       Vitals:    18 0834   BP: 110/68   Pulse: 99   Resp: 20   Temp: 98.4 °F (36.9 °C)   TempSrc: Oral   SpO2: 97%   Weight: 158 lb (71.7 kg)   Height: 5' 8\" (1.727 m)   PainSc:   0 - No pain       Learning Assessment:     Learning Assessment 2015   PRIMARY LEARNER Patient   HIGHEST LEVEL OF EDUCATION - PRIMARY LEARNER  4 YEARS OF COLLEGE   BARRIERS PRIMARY LEARNER NONE   CO-LEARNER CAREGIVER No   PRIMARY LANGUAGE ENGLISH    NEED No   LEARNER PREFERENCE PRIMARY DEMONSTRATION     DEMONSTRATION   ANSWERED BY Patient   RELATIONSHIP SELF     Depression Screening:     PHQ over the last two weeks 2017   Little interest or pleasure in doing things Not at all   Feeling down, depressed or hopeless Not at all   Total Score PHQ 2 0     Fall Risk Assessment:     Fall Risk Assessment, last 12 mths 2017   Able to walk? Yes   Fall in past 12 months? No     Abuse Screening:     Abuse Screening Questionnaire 11/3/2017   Do you ever feel afraid of your partner? N   Are you in a relationship with someone who physically or mentally threatens you? N   Is it safe for you to go home? Y     Coordination of Care Questionaire:   1. Have you been to the ER, urgent care clinic since your last visit? Hospitalized since your last visit? YES. 2/15/18 [facility]    2. Have you seen or consulted any other health care providers outside of the [address] [place] [address] since your last visit? Include any pap smears or colon screening. NO    Advanced Directive:   1. Do you have an Advanced Directive? YES    2. Would you like information on Advanced Directives?  NO

## 2018-03-18 DIAGNOSIS — I10 BENIGN HYPERTENSION: ICD-10-CM

## 2018-03-18 DIAGNOSIS — E11.42 DIABETIC POLYNEUROPATHY ASSOCIATED WITH TYPE 2 DIABETES MELLITUS (HCC): ICD-10-CM

## 2018-03-19 RX ORDER — LISINOPRIL 10 MG/1
TABLET ORAL
Qty: 90 TAB | Refills: 0 | Status: SHIPPED | OUTPATIENT
Start: 2018-03-19 | End: 2018-06-19 | Stop reason: SDUPTHER

## 2018-03-19 RX ORDER — CLONAZEPAM 0.5 MG/1
TABLET ORAL
Qty: 180 TAB | Refills: 0 | Status: SHIPPED | OUTPATIENT
Start: 2018-03-19 | End: 2018-06-17 | Stop reason: SDUPTHER

## 2018-03-19 NOTE — TELEPHONE ENCOUNTER
From: Carole Child  To: Aaliyah Barragan MD  Sent: 3/18/2018 6:32 PM EDT  Subject: Medication Renewal Request    Original authorizing provider: Aaliyah Barragan MD    Carole Sibley would like a refill of the following medications:  lisinopril (PRINIVIL, ZESTRIL) 10 mg tablet Aaliyah Barragan MD]    Preferred pharmacy: [facility]:

## 2018-06-17 DIAGNOSIS — E11.42 DIABETIC POLYNEUROPATHY ASSOCIATED WITH TYPE 2 DIABETES MELLITUS (HCC): ICD-10-CM

## 2018-06-18 RX ORDER — CLONAZEPAM 0.5 MG/1
TABLET ORAL
Qty: 180 TAB | Refills: 0 | Status: SHIPPED | OUTPATIENT
Start: 2018-06-18 | End: 2018-09-17 | Stop reason: SDUPTHER

## 2018-06-19 DIAGNOSIS — E11.42 TYPE 2 DIABETES MELLITUS WITH DIABETIC POLYNEUROPATHY (HCC): ICD-10-CM

## 2018-06-19 DIAGNOSIS — I10 BENIGN HYPERTENSION: ICD-10-CM

## 2018-06-19 RX ORDER — GLUCAGON 1 MG
VIAL (EA) INJECTION
Qty: 3 KIT | Refills: 0 | Status: SHIPPED | OUTPATIENT
Start: 2018-06-19 | End: 2018-07-31 | Stop reason: SDUPTHER

## 2018-06-19 RX ORDER — LISINOPRIL 10 MG/1
TABLET ORAL
Qty: 90 TAB | Refills: 0 | Status: SHIPPED | OUTPATIENT
Start: 2018-06-19 | End: 2018-09-07 | Stop reason: ALTCHOICE

## 2018-07-11 RX ORDER — GABAPENTIN 300 MG/1
CAPSULE ORAL
Qty: 90 CAP | Refills: 0 | Status: SHIPPED | OUTPATIENT
Start: 2018-07-11 | End: 2018-10-07 | Stop reason: SDUPTHER

## 2018-07-18 LAB
HBA1C MFR BLD HPLC: 6.9 %
LDL-C, EXTERNAL: 119

## 2018-07-31 DIAGNOSIS — E11.42 TYPE 2 DIABETES MELLITUS WITH DIABETIC POLYNEUROPATHY (HCC): ICD-10-CM

## 2018-07-31 RX ORDER — GLUCAGON 1 MG
VIAL (EA) INJECTION
Qty: 3 KIT | Refills: 0 | Status: SHIPPED | OUTPATIENT
Start: 2018-07-31 | End: 2018-08-01 | Stop reason: SDUPTHER

## 2018-08-01 DIAGNOSIS — E11.42 TYPE 2 DIABETES MELLITUS WITH DIABETIC POLYNEUROPATHY (HCC): ICD-10-CM

## 2018-08-01 RX ORDER — GLUCAGON 1 MG
VIAL (EA) INJECTION
Qty: 3 KIT | Refills: 0 | Status: SHIPPED | OUTPATIENT
Start: 2018-08-01 | End: 2018-12-01 | Stop reason: SDUPTHER

## 2018-09-07 ENCOUNTER — OFFICE VISIT (OUTPATIENT)
Dept: FAMILY MEDICINE CLINIC | Age: 63
End: 2018-09-07

## 2018-09-07 ENCOUNTER — HOSPITAL ENCOUNTER (OUTPATIENT)
Dept: LAB | Age: 63
Discharge: HOME | End: 2018-09-07
Payer: COMMERCIAL

## 2018-09-07 VITALS
HEIGHT: 68 IN | RESPIRATION RATE: 20 BRPM | DIASTOLIC BLOOD PRESSURE: 68 MMHG | TEMPERATURE: 98.6 F | WEIGHT: 161 LBS | HEART RATE: 95 BPM | OXYGEN SATURATION: 96 % | BODY MASS INDEX: 24.4 KG/M2 | SYSTOLIC BLOOD PRESSURE: 100 MMHG

## 2018-09-07 DIAGNOSIS — E78.00 PURE HYPERCHOLESTEROLEMIA: ICD-10-CM

## 2018-09-07 DIAGNOSIS — E10.40 TYPE 1 DIABETES MELLITUS WITH DIABETIC NEUROPATHY (HCC): Primary | ICD-10-CM

## 2018-09-07 DIAGNOSIS — R79.89 ELEVATED PROLACTIN LEVEL: ICD-10-CM

## 2018-09-07 DIAGNOSIS — E10.40 TYPE 1 DIABETES MELLITUS WITH DIABETIC NEUROPATHY (HCC): ICD-10-CM

## 2018-09-07 DIAGNOSIS — Z11.59 SPECIAL SCREENING EXAMINATION FOR VIRAL DISEASE: ICD-10-CM

## 2018-09-07 LAB
ALBUMIN SERPL-MCNC: 3.9 G/DL (ref 3.4–5)
ALBUMIN/GLOB SERPL: 1.3 {RATIO} (ref 0.8–1.7)
ALP SERPL-CCNC: 98 U/L (ref 45–117)
ALT SERPL-CCNC: 26 U/L (ref 16–61)
ANION GAP SERPL CALC-SCNC: 6 MMOL/L (ref 3–18)
AST SERPL-CCNC: 16 U/L (ref 15–37)
BILIRUB SERPL-MCNC: 0.4 MG/DL (ref 0.2–1)
BUN SERPL-MCNC: 21 MG/DL (ref 7–18)
BUN/CREAT SERPL: 23 (ref 12–20)
CALCIUM SERPL-MCNC: 9.1 MG/DL (ref 8.5–10.1)
CHLORIDE SERPL-SCNC: 106 MMOL/L (ref 100–108)
CHOLEST SERPL-MCNC: 186 MG/DL
CO2 SERPL-SCNC: 30 MMOL/L (ref 21–32)
CREAT SERPL-MCNC: 0.9 MG/DL (ref 0.6–1.3)
ERYTHROCYTE [DISTWIDTH] IN BLOOD BY AUTOMATED COUNT: 13.6 % (ref 11.6–14.5)
GLOBULIN SER CALC-MCNC: 3.1 G/DL (ref 2–4)
GLUCOSE SERPL-MCNC: 100 MG/DL (ref 74–99)
HBA1C MFR BLD HPLC: 6.7 %
HCT VFR BLD AUTO: 47.1 % (ref 36–48)
HDLC SERPL-MCNC: 52 MG/DL (ref 40–60)
HDLC SERPL: 3.6 {RATIO} (ref 0–5)
HGB BLD-MCNC: 15.4 G/DL (ref 13–16)
LDLC SERPL CALC-MCNC: 120.4 MG/DL (ref 0–100)
LIPID PROFILE,FLP: ABNORMAL
MCH RBC QN AUTO: 31.5 PG (ref 24–34)
MCHC RBC AUTO-ENTMCNC: 32.7 G/DL (ref 31–37)
MCV RBC AUTO: 96.3 FL (ref 74–97)
PLATELET # BLD AUTO: 283 K/UL (ref 135–420)
PMV BLD AUTO: 11 FL (ref 9.2–11.8)
POTASSIUM SERPL-SCNC: 4.7 MMOL/L (ref 3.5–5.5)
PROT SERPL-MCNC: 7 G/DL (ref 6.4–8.2)
RBC # BLD AUTO: 4.89 M/UL (ref 4.7–5.5)
SODIUM SERPL-SCNC: 142 MMOL/L (ref 136–145)
TRIGL SERPL-MCNC: 68 MG/DL (ref ?–150)
TSH SERPL DL<=0.05 MIU/L-ACNC: 0.51 UIU/ML (ref 0.36–3.74)
VLDLC SERPL CALC-MCNC: 13.6 MG/DL
WBC # BLD AUTO: 5.3 K/UL (ref 4.6–13.2)

## 2018-09-07 PROCEDURE — 36415 COLL VENOUS BLD VENIPUNCTURE: CPT | Performed by: INTERNAL MEDICINE

## 2018-09-07 PROCEDURE — 84443 ASSAY THYROID STIM HORMONE: CPT | Performed by: INTERNAL MEDICINE

## 2018-09-07 PROCEDURE — 80053 COMPREHEN METABOLIC PANEL: CPT | Performed by: INTERNAL MEDICINE

## 2018-09-07 PROCEDURE — 80061 LIPID PANEL: CPT | Performed by: INTERNAL MEDICINE

## 2018-09-07 PROCEDURE — 86803 HEPATITIS C AB TEST: CPT | Performed by: INTERNAL MEDICINE

## 2018-09-07 PROCEDURE — 85027 COMPLETE CBC AUTOMATED: CPT | Performed by: INTERNAL MEDICINE

## 2018-09-07 PROCEDURE — 82043 UR ALBUMIN QUANTITATIVE: CPT | Performed by: INTERNAL MEDICINE

## 2018-09-07 NOTE — PROGRESS NOTES
Assessment/Plan: 1. Type 1 diabetes mellitus with diabetic neuropathy (HCC) 
-A1c 6.7. Managed by Dr. Lashawn Blackmon. - AMB POC HEMOGLOBIN A1C 
-  DIABETES FOOT EXAM 
- MICROALBUMIN, UR, RAND W/ MICROALB/CREAT RATIO; Future - METABOLIC PANEL, COMPREHENSIVE; Future 2. Elevated prolactin level ([facility] [address] -f/u with Dr. Lashawn Blackmon 
- Jw Rahman; Future 3. Pure hypercholesterolemia 
-ck lipids. On statin - LIPID PANEL; Future 4. Special screening examination for viral disease 
- HCV AB W/RFLX TO [facility]; Future The plan was discussed with the patient. The patient verbalized understanding and is in agreement with the plan. All medication potential side effects were discussed with the patient. Health Maintenance:  
Health Maintenance Topic Date Due  
 Hepatitis C Screening  1955  MICROALBUMIN Q1  03/11/2018  HEMOGLOBIN A1C Q6M  01/18/2019  
 EYE EXAM RETINAL OR DILATED Q1  04/27/2019  LIPID PANEL Q1  07/18/2019  
 FOOT EXAM Q1  09/07/2019  COLONOSCOPY  12/31/2019  
 DTaP/Tdap/Td series (2 - Td) 08/28/2025  ZOSTER VACCINE AGE 60>  Completed  Pneumococcal 19-64 Medium Risk  Completed  Influenza Age 5 to Adult  Completed Henry Eller is a 66 y.o. male and presents with Hypertension and Diabetes Subjective: 
Pt had recent voluntary admission to [facility] for auditory hallucinations. DM1 - A1c is 6.7. Recently noted to have elevated prolactin. CT nml.  tsh was nml. He's on lisinopril 5mg. bp has been low, systolic of 59G, and is feeling dizzy. No h/o microalburinuria. 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 ROS: 
Constitutional: No recent weight change. No weakness/fatigue. No f/c. Cardiovascular: No CP/palpitations. No WILLAMS/orthopnea/PND. Respiratory: No cough/sputum, dyspnea, wheezing. Gastointestinal: No dysphagia, reflux. No n/v. No constipation/diarrhea. No melena/rectal bleeding. Psychiatric:  No depression, anxiety. The problem list was updated as a part of today's visit. Patient Active Problem List  
Diagnosis Code  Benign hypertension I10  
 Diabetic neuropathy (HCC) E11.40  GERD (gastroesophageal reflux disease) K21.9  History of nephrolithiasis Z87.442  Insulin pump in place Z96.41  
 Diabetic retinopathy ([facility] [address] E11.319  
 Diplopia H53.2  Cerebral microvascular disease I67.9  Diffuse cerebral atrophy G31.9  
 Encephalomalacia on imaging study G93.89  
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  
 Branch retinal vein occlusion [id number]   (central serous retinopathy) H35.719  Pure hypercholesterolemia E78.00  
 Diabetes mellitus type 1 with neurological manifestations ([facility] [address] E10.49  Erosive esophagitis K22.10  Auditory hallucination R44.0 The PSH, FH were reviewed. SH: Social History Substance Use Topics  Smoking status: Never Smoker  Smokeless tobacco: Never Used  Alcohol use No  
 
 
Medications/Allergies: 
Current Outpatient Prescriptions on File Prior to Visit Medication Sig Dispense Refill  atorvastatin (LIPITOR) 20 mg tablet TAKE 1 TABLET BY MOUTH DAILY 90 Tab 1  
 GLUCAGON EMERGENCY KIT, HUMAN, 1 mg injection INJECT 1 MG IN THE MUSCLE AS NEEDED FOR LOW BLOOD SUGAR 3 Kit 0  
 gabapentin (NEURONTIN) 300 mg capsule TAKE 1 CAPSULE BY MOUTH EVERY NIGHT AT BEDTIME 90 Cap 0  
 lisinopril (PRINIVIL, ZESTRIL) 10 mg tablet TAKE 1 TABLET BY MOUTH DAILY 90 Tab 0  clonazePAM (KLONOPIN) 0.5 mg tablet TAKE 1 TABLET BY MOUTH TWICE DAILY 180 Tab 0  clopidogrel (PLAVIX) 75 mg tab TAKE 1 TABLET BY MOUTH DAILY 90 Tab 3  
 omeprazole (PRILOSEC) 40 mg capsule TAKE 1 CAPSULE BY MOUTH DAILY 90 Cap 3  Alpha Lipoic Acid 200 mg tab Take  by mouth daily.  HUMALOG 100 unit/mL injection USE AS DIRECTED WITH PUMP 180 mL 12  
 ascorbic acid (VITAMIN C) 1,000 mg tablet Take  by mouth daily.  multivitamins-minerals-lutein (CENTRUM SILVER) Tab Take  by mouth daily.  vitamin e (E GEMS) 1,000 unit capsule Take 1,000 Units by mouth daily. Patient is taking 400 iu  Cholecalciferol, Vitamin D3, 5,000 unit Tab Take  by mouth daily. Patient is currently taking 2000 iu    
 omega-3 fatty acids-vitamin e (FISH OIL) 1,000 mg cap Take 1 Cap by mouth daily.  coenzyme q10 10 mg cap Take  by mouth two (2) times a day.  lurasidone (LATUDA) 20 mg tab tablet Take 20 mg by mouth daily (with dinner). No current facility-administered medications on file prior to visit. No Known Allergies Objective: 
Visit Vitals  /68 (BP 1 Location: Left arm, BP Patient Position: Sitting)  Pulse 95  Temp 98.6 °F (37 °C) (Oral)  Resp 20  
 Ht 5' 8\" (1.727 m)  Wt 161 lb (73 kg)  SpO2 96%  BMI 24.48 kg/m2 Constitutional: Well developed, nourished, no distress, alert CV: S1, S2.  RRR. No murmurs/rubs. No thrills palpated. No carotid bruits. Intact distal pulses. No edema. No aortic bruits. Pulm: No abnormalities on inspection. Clear to auscultation bilaterally. No wheezing/rhonchi. Normal effort. Psych: Appropriate affect, judgement and insight. Short-term memory intact. Monofilament nml

## 2018-09-07 NOTE — MR AVS SNAPSHOT
[address] [place] [address] [address] [facility] [id number] [phone] Patient: Urban Evans MRN: [id number] Visit Information Date & Time Provider Department Dept. Phone Encounter #  
 9/7/2018 10:00 AM Richa Abreu, [employer] [id number] Follow-up Instructions Return in about 6 months (around 3/7/2019). Upcoming Health Maintenance Date Due Hepatitis C Screening 1955 MICROALBUMIN Q1 3/11/2018 HEMOGLOBIN A1C Q6M 1/18/2019 EYE EXAM RETINAL OR DILATED Q1 4/27/2019 LIPID PANEL Q1 7/18/2019 FOOT EXAM Q1 9/7/2019 COLONOSCOPY 12/31/2019 DTaP/Tdap/Td series (2 - Td) 8/28/2025 Allergies as of 9/7/2018  Review Complete On: 9/7/2018 By: Richa Abreu MD  
 No Known Allergies Current Immunizations  Reviewed on 12/22/2017 Name Date Influenza Vaccine 8/16/2018, 12/4/2017 12:00 AM, 9/29/2017, 11/1/2013 12:00 AM  
 Influenza Vaccine (Quad) PF 8/26/2016  9:36 AM  
 Influenza Vaccine PF 11/8/2013 Pneumococcal Polysaccharide (PPSV-23) 11/4/2016  9:15 AM, 3/1/2011 12:00 AM  
 Tdap 8/28/2015 Zoster Recombinant 8/27/2018 Not reviewed this visit You Were Diagnosed With   
  
 Codes Comments Type 1 diabetes mellitus with diabetic neuropathy (HCC)    -  Primary ICD-10-CM: E10.40 ICD-9-CM: 250.61, 357.2 Elevated prolactin level (HCC)     ICD-10-CM: E22.9 ICD-9-CM: 253.1 Pure hypercholesterolemia     ICD-10-CM: E78.00 ICD-9-CM: 272.0 Special screening examination for viral disease     ICD-10-CM: Z11.59 
ICD-9-CM: V73.99 Vitals BP Pulse Temp Resp Height(growth percentile) Weight(growth percentile) 100/68 (BP 1 Location: Left arm, BP Patient Position: Sitting) 95 98.6 °F (37 °C) (Oral) 20 5' 8\" (1.727 m) 161 lb (73 kg) SpO2 BMI Smoking Status 96% 24.48 kg/m2 Never Smoker Vitals History BMI and BSA Data Body Mass Index Body Surface Area  
 24.48 kg/m 2 1.87 m 2 Preferred Pharmacy Pharmacy Name Phone [facility] [address], [address] [phone] Your Updated Medication List  
  
   
This list is accurate as of 9/7/18 10:14 AM.  Always use your most recent med list.  
  
  
  
  
 Alpha Lipoic Acid 200 mg Tab Take  by mouth daily. atorvastatin 20 mg tablet Commonly known as:  LIPITOR  
TAKE 1 TABLET BY MOUTH DAILY CENTRUM SILVER Tab tablet Generic drug:  multivitamins-minerals-lutein Take  by mouth daily. cholecalciferol (VITAMIN D3) 5,000 unit Tab tablet Commonly known as:  VITAMIN D3 Take  by mouth daily. Patient is currently taking 2000 iu  
  
 clonazePAM 0.5 mg tablet Commonly known as:  KlonoPIN  
TAKE 1 TABLET BY MOUTH TWICE DAILY  
  
 clopidogrel 75 mg Tab Commonly known as:  PLAVIX TAKE 1 TABLET BY MOUTH DAILY coenzyme q10 10 mg Cap Take  by mouth two (2) times a day. FISH OIL 1,000 mg Cap Generic drug:  omega-3 fatty acids-vitamin e Take 1 Cap by mouth daily. gabapentin 300 mg capsule Commonly known as:  NEURONTIN  
TAKE 1 CAPSULE BY MOUTH EVERY NIGHT AT BEDTIME  
  
 GLUCAGON EMERGENCY KIT (HUMAN) 1 mg injection Generic drug:  glucagon INJECT 1 MG IN THE MUSCLE AS NEEDED FOR LOW BLOOD SUGAR HumaLOG U-100 Insulin 100 unit/mL injection Generic drug:  insulin lispro USE AS DIRECTED WITH PUMP  
  
 omeprazole 40 mg capsule Commonly known as:  PRILOSEC  
TAKE 1 CAPSULE BY MOUTH DAILY  
  
 VITAMIN C 1,000 mg tablet Generic drug:  ascorbic acid (vitamin C) Take  by mouth daily. vitamin e 1,000 unit capsule Commonly known as:  E GEMS Take 1,000 Units by mouth daily. Patient is taking 400 iu We Performed the Following AMB EXT HGBA1C [[id number] CPT(R)] Comments: This external order was created through the Results Console. AMB EXT LDL-C [[id number] CPT(R)] Comments: This external order was created through the Results Console. AMB POC HEMOGLOBIN A1C [99420 CPT(R)]  DIABETES FOOT EXAM [HM7 Custom] Follow-up Instructions Return in about 6 months (around 3/7/2019). To-Do List   
 09/07/2018 Lab:  HCV AB W/RFLX TO MARY BETH   
  
 09/07/2018 Lab:  LIPID PANEL   
  
 09/07/2018 Lab:  METABOLIC PANEL, COMPREHENSIVE   
  
 09/07/2018 Lab:  MICROALBUMIN, UR, RAND W/ MICROALB/CREAT RATIO   
  
 09/07/2018 Lab:  TSH 3RD GENERATION Introducing [facility]! Dear Ayleen Solomon: Thank you for requesting a [employer] account. Our records indicate that you already have an active [employer] account. You can access your account anytime at [email or web address] Did you know that you can access your hospital and ER discharge instructions at any time in [employer]is? You can also review all of your test results from your hospital stay or ER visit. Additional Information If you have questions, please visit the Frequently Asked Questions section of the [employer] website at [email or web address] Remember, [employer]is is NOT to be used for urgent needs. For medical emergencies, dial 911. Now available from your iPhone and Android! Please provide this summary of care documentation to your next provider. Your primary care clinician is listed as Brody Jennings. If you have any questions after today's visit, please call [phone].

## 2018-09-07 NOTE — PROGRESS NOTES
Cleopatra Phoenix is a 66 y.o. male (: 1955) presenting to address: Chief Complaint Patient presents with  Hypertension  Diabetes Vitals:  
 18 0945 BP: 100/68 Pulse: 95 Resp: 20 Temp: 98.6 °F (37 °C) TempSrc: Oral  
SpO2: 96% Weight: 161 lb (73 kg) Height: 5' 8\" (1.727 m) PainSc:   0 - No pain Learning Assessment:  
 
Learning Assessment 2015 PRIMARY LEARNER Patient HIGHEST LEVEL OF EDUCATION - PRIMARY LEARNER  4 YEARS OF COLLEGE  
BARRIERS PRIMARY LEARNER NONE  
CO-LEARNER CAREGIVER No  
PRIMARY LANGUAGE ENGLISH  NEED No  
LEARNER PREFERENCE PRIMARY DEMONSTRATION  
  DEMONSTRATION  
ANSWERED BY Patient RELATIONSHIP SELF Depression Screening: PHQ over the last two weeks 2018 Little interest or pleasure in doing things Not at all Feeling down, depressed, irritable, or hopeless Not at all Total Score PHQ 2 0 Fall Risk Assessment:  
 
Fall Risk Assessment, last 12 mths 2018 Able to walk? Yes Fall in past 12 months? No  
 
Abuse Screening:  
 
Abuse Screening Questionnaire 2018 Do you ever feel afraid of your partner? Euell Lena Are you in a relationship with someone who physically or mentally threatens you? Euell Lena Is it safe for you to go home? Min Ochoa Coordination of Care Questionaire: 1. Have you been to the ER, urgent care clinic since your last visit? Hospitalized since your last visit? YES [facility] 18 
 
2. Have you seen or consulted any other health care providers outside of the [address] [place] [address] since your last visit? Include any pap smears or colon screening. NO Advanced Directive: 1. Do you have an Advanced Directive? YES 
 
2. Would you like information on Advanced Directives?  NO

## 2018-09-08 LAB
CREAT UR-MCNC: 49.45 MG/DL (ref 30–125)
HCV AB S/CO SERPL IA: <0.1 S/CO RATIO (ref 0–0.9)
HCV AB SERPL QL IA: NORMAL
MICROALBUMIN UR-MCNC: 0.4 MG/DL (ref 0–3)
MICROALBUMIN/CREAT UR-RTO: <8 MG/G (ref 0–30)

## 2018-09-11 RX ORDER — ATORVASTATIN CALCIUM 40 MG/1
TABLET, FILM COATED ORAL
Qty: 90 TAB | Refills: 3 | Status: SHIPPED | OUTPATIENT
Start: 2018-09-11

## 2018-09-17 DIAGNOSIS — E11.42 DIABETIC POLYNEUROPATHY ASSOCIATED WITH TYPE 2 DIABETES MELLITUS (HCC): ICD-10-CM

## 2018-09-17 RX ORDER — CLONAZEPAM 0.5 MG/1
0.5 TABLET ORAL 2 TIMES DAILY
Qty: 180 TAB | Refills: 0 | Status: SHIPPED | OUTPATIENT
Start: 2018-09-17 | End: 2018-11-09 | Stop reason: SDUPTHER

## 2018-09-17 NOTE — TELEPHONE ENCOUNTER
From: Yvonne Ramirez  To: Mally Molina MD  Sent: 9/17/2018 12:08 PM EDT  Subject: Medication Renewal Request    Original authorizing provider: MD Yvonne Hogue would like a refill of the following medications:  clonazePAM (KLONOPIN) 0.5 mg tablet Mally Molina MD]    Preferred pharmacy: [facility] AT [address] [place] [address]    Comment:

## 2018-10-09 RX ORDER — GABAPENTIN 300 MG/1
CAPSULE ORAL
Qty: 90 CAP | Refills: 0 | Status: SHIPPED | OUTPATIENT
Start: 2018-10-09

## 2018-12-01 DIAGNOSIS — E11.42 TYPE 2 DIABETES MELLITUS WITH DIABETIC POLYNEUROPATHY (HCC): ICD-10-CM

## 2018-12-03 RX ORDER — GLUCAGON 1 MG
VIAL (EA) INJECTION
Qty: 3 VIAL | Refills: 0 | Status: SHIPPED | OUTPATIENT
Start: 2018-12-03